# Patient Record
Sex: FEMALE | Race: WHITE | NOT HISPANIC OR LATINO | Employment: OTHER | URBAN - METROPOLITAN AREA
[De-identification: names, ages, dates, MRNs, and addresses within clinical notes are randomized per-mention and may not be internally consistent; named-entity substitution may affect disease eponyms.]

---

## 2019-02-22 ENCOUNTER — CONVERSION ENCOUNTER (OUTPATIENT)
Dept: PLASTIC SURGERY | Facility: CLINIC | Age: 64
End: 2019-02-22

## 2019-02-22 ENCOUNTER — OFFICE VISIT CONVERTED (OUTPATIENT)
Dept: PLASTIC SURGERY | Facility: CLINIC | Age: 64
End: 2019-02-22
Attending: PLASTIC SURGERY

## 2019-03-29 ENCOUNTER — OFFICE VISIT CONVERTED (OUTPATIENT)
Dept: ORTHOPEDIC SURGERY | Facility: CLINIC | Age: 64
End: 2019-03-29
Attending: PHYSICIAN ASSISTANT

## 2019-04-29 ENCOUNTER — OFFICE VISIT CONVERTED (OUTPATIENT)
Dept: ORTHOPEDIC SURGERY | Facility: CLINIC | Age: 64
End: 2019-04-29
Attending: PHYSICIAN ASSISTANT

## 2019-07-01 ENCOUNTER — OFFICE VISIT CONVERTED (OUTPATIENT)
Dept: ORTHOPEDIC SURGERY | Facility: CLINIC | Age: 64
End: 2019-07-01
Attending: PHYSICIAN ASSISTANT

## 2019-08-12 ENCOUNTER — OFFICE VISIT CONVERTED (OUTPATIENT)
Dept: ORTHOPEDIC SURGERY | Facility: CLINIC | Age: 64
End: 2019-08-12
Attending: PHYSICIAN ASSISTANT

## 2019-08-19 ENCOUNTER — HOSPITAL ENCOUNTER (OUTPATIENT)
Dept: MRI IMAGING | Facility: HOSPITAL | Age: 64
Discharge: HOME | End: 2019-08-19
Attending: ANESTHESIOLOGY

## 2019-10-09 ENCOUNTER — OFFICE VISIT CONVERTED (OUTPATIENT)
Dept: ORTHOPEDIC SURGERY | Facility: CLINIC | Age: 64
End: 2019-10-09

## 2021-05-15 VITALS
HEIGHT: 63 IN | OXYGEN SATURATION: 100 % | WEIGHT: 130 LBS | SYSTOLIC BLOOD PRESSURE: 117 MMHG | DIASTOLIC BLOOD PRESSURE: 76 MMHG | HEART RATE: 66 BPM | BODY MASS INDEX: 23.04 KG/M2

## 2021-05-15 VITALS — HEIGHT: 63 IN | WEIGHT: 127 LBS | OXYGEN SATURATION: 98 % | BODY MASS INDEX: 22.5 KG/M2 | HEART RATE: 76 BPM

## 2021-05-15 VITALS — OXYGEN SATURATION: 98 % | HEART RATE: 87 BPM | BODY MASS INDEX: 23.04 KG/M2 | HEIGHT: 63 IN | WEIGHT: 130 LBS

## 2021-05-15 VITALS — OXYGEN SATURATION: 98 % | BODY MASS INDEX: 23.04 KG/M2 | HEART RATE: 77 BPM | HEIGHT: 63 IN | WEIGHT: 130 LBS

## 2021-05-15 VITALS — HEART RATE: 73 BPM | WEIGHT: 124.5 LBS | OXYGEN SATURATION: 97 % | HEIGHT: 63 IN | BODY MASS INDEX: 22.06 KG/M2

## 2021-05-15 VITALS — OXYGEN SATURATION: 98 % | HEART RATE: 75 BPM | WEIGHT: 144.25 LBS | BODY MASS INDEX: 25.56 KG/M2 | HEIGHT: 63 IN

## 2022-03-09 NOTE — PROGRESS NOTES
Chief Complaint        Abdominal pain, vomiting,and diarrhea     History of Present Illness      Kimberly Landers is a 66 y.o. female who presents to [facility] as a new patient with a history of abdominal pain, altered bowel habits, diarrhea, nocturnal diarrhea, history of C. difficile, nausea and vomiting.  Patient reports for the past 3 months she has had resolution of her symptoms but was afraid to cancel her appointment due to symptoms prior.  Patient reports in January 2021 she was diagnosed with C. difficile infection which was treated with antibiotics.  She reports prior to her C. difficile infection that she was waking up throughout the night with diarrhea.  Patient reports after treatment for C. difficile this did not improve.  She reports waking up every morning at around 3 AM and having diarrhea every morning for about 2 hours for over a year.  Patient reports over the past 3 months her bowel movements have been good occurring 1-2 times daily with normal consistency and color.  Patient has been really watching her diet for foods that cause irritation.  She was having nausea and vomiting intermittently that has since resolved.  She denies any family history of colorectal cancer.  Patient denies fever, nausea, vomiting, weight loss, night sweats, melena, hematochezia, hematemesis.  Patient reports she had her gallbladder checked back in 2016 and had low ejection fraction at that time.    Labs performed on 12/07/2021    No recent abdominal imaging.     EGD: Review of the patient's most recent EGD performed by Dr. Santa on 07/07/2016 dyspepsia, heartburn, nausea postoperative diagnosis was gastritis.  It was noted that she had 29% ejection fraction prior to EGD.    Colonoscopy performed in January 2017 by Dr. Santa.    Results       Result Review :   The following data was reviewed by: Kylah Ang NP on 03/11/2022                        Past Medical History       Past  Medical History:   Diagnosis Date   • Chronic kidney disease    • Hypertension        Past Surgical History:   Procedure Laterality Date   • COLONOSCOPY     • DILATATION AND CURETTAGE      x3         Current Outpatient Medications:   •  amLODIPine (NORVASC) 10 MG tablet, amlodipine 10 mg tablet, Disp: , Rfl:   •  calcium carbonate (OS-PETE) 1250 (500 Ca) MG tablet, Oyster Shell Calcium 500  500 mg calcium (1,250 mg) tablet, Disp: , Rfl:   •  cefdinir (OMNICEF) 300 MG capsule, Take 300 mg by mouth 2 (Two) Times a Day., Disp: , Rfl:   •  cetirizine (zyrTEC) 10 MG tablet, Take 20 mg by mouth Daily., Disp: , Rfl:   •  Cholecalciferol 10 MCG (400 UNIT) capsule, Vitamin D3 10 mcg (400 unit) capsule  TAKE 1 CAPSULE BY MOUTH ONCE DAILY, Disp: , Rfl:   •  citalopram (CeleXA) 10 MG tablet, citalopram 10 mg tablet, Disp: , Rfl:   •  cyanocobalamin 1000 MCG/ML injection, cyanocobalamin (vit B-12) 1,000 mcg/mL injection solution, Disp: , Rfl:   •  cyclobenzaprine (FLEXERIL) 10 MG tablet, cyclobenzaprine 10 mg tablet, Disp: , Rfl:   •  cycloSPORINE, PF, (Cequa) 0.09 % solution, Cequa 0.09 % eye drops in a dropperette, Disp: , Rfl:   •  dicyclomine (BENTYL) 20 MG tablet, dicyclomine 20 mg tablet, Disp: , Rfl:   •  fluticasone (FLONASE) 50 MCG/ACT nasal spray, fluticasone propionate 50 mcg/actuation nasal spray,suspension  USE 2 SPRAY(S) IN EACH NOSTRIL ONCE DAILY, Disp: , Rfl:   •  hydroCHLOROthiazide (HYDRODIURIL) 12.5 MG tablet, hydrochlorothiazide 12.5 mg tablet, Disp: , Rfl:   •  hydrocortisone 2.5 % cream, APPLY TOPICALLY 2 TIMES DAILY, APPLY BILATERAL TO FOREARMS AS NEEDED FOR RASH/ITCHING, Disp: , Rfl:   •  hydrOXYzine (ATARAX) 25 MG tablet, TAKE 1/2 TO 1 (ONE-HALF TO ONE) TABLET BY MOUTH TWICE DAILY AS NEEDED FOR ANXIETY, Disp: , Rfl:   •  lisinopril (PRINIVIL,ZESTRIL) 40 MG tablet, lisinopril 40 mg tablet, Disp: , Rfl:   •  omeprazole (priLOSEC) 40 MG capsule, Take 40 mg by mouth Daily., Disp: , Rfl:   •  ondansetron ODT  "(ZOFRAN-ODT) 4 MG disintegrating tablet, ondansetron 4 mg disintegrating tablet  DISSOLVE 1 TABLET IN MOUTH THREE TIMES DAILY AS NEEDED, Disp: , Rfl:   •  propranolol (INDERAL) 40 MG tablet, propranolol 40 mg tablet  TAKE 1 TABLET BY MOUTH TWICE DAILY, Disp: , Rfl:   •  polyethylene glycol (GoLYTELY) 236 g solution, Instructions provided by the office.  Colon prep., Disp: 4000 mL, Rfl: 0     Allergies   Allergen Reactions   • Codeine Nausea And Vomiting   • Propoxyphene Nausea And Vomiting     Hot flashes and sweaty         Family History   Problem Relation Age of Onset   • Colon cancer Neg Hx         Social History     Social History Narrative   • Not on file       Objective       Objective     Vital Signs:   /73 (BP Location: Left arm, Patient Position: Sitting, Cuff Size: Adult)   Pulse 66   Ht 160 cm (63\")   Wt 63.2 kg (139 lb 6.4 oz)   SpO2 100%   BMI 24.69 kg/m²     Body mass index is 24.69 kg/m².    Physical Exam  Constitutional:       General: She is not in acute distress.     Appearance: Normal appearance. She is well-developed and normal weight.   Eyes:      Conjunctiva/sclera: Conjunctivae normal.      Pupils: Pupils are equal, round, and reactive to light.      Visual Fields: Right eye visual fields normal and left eye visual fields normal.   Cardiovascular:      Rate and Rhythm: Normal rate and regular rhythm.      Heart sounds: Normal heart sounds.   Pulmonary:      Effort: Pulmonary effort is normal. No retractions.      Breath sounds: Normal breath sounds and air entry.      Comments: Inspection of chest: normal appearance  Abdominal:      General: Bowel sounds are normal.      Palpations: Abdomen is soft.      Tenderness: There is no abdominal tenderness.      Comments: No appreciable hepatosplenomegaly   Musculoskeletal:      Cervical back: Neck supple.      Right lower leg: No edema.      Left lower leg: No edema.   Lymphadenopathy:      Cervical: No cervical adenopathy.   Skin:     " Findings: No lesion.      Comments: Turgor normal   Neurological:      Mental Status: She is alert and oriented to person, place, and time.   Psychiatric:         Mood and Affect: Mood and affect normal.              Assessment & Plan          Assessment and Plan    Diagnoses and all orders for this visit:    1. Generalized abdominal pain (Primary)  -     Cancel: US Abdomen Limited; Future  -     COVID-19,APTIMA PANTHER(JIM),[facility], NP/OP SWAB IN UTM/VTM/SALINE TRANSPORT MEDIA,24 HR TAT - Swab, Nasal Cavity; Future  -     US Abdomen Limited; Future    2. Altered bowel habits  -     Cancel: US Abdomen Limited; Future  -     COVID-19,APTIMA PANTHER(JIM),[facility], NP/OP SWAB IN UTM/VTM/SALINE TRANSPORT MEDIA,24 HR TAT - Swab, Nasal Cavity; Future  -     US Abdomen Limited; Future    3. Diarrhea, unspecified type  -     Cancel: US Abdomen Limited; Future  -     COVID-19,APTIMA PANTHER(JIM),[facility], NP/OP SWAB IN UTM/VTM/SALINE TRANSPORT MEDIA,24 HR TAT - Swab, Nasal Cavity; Future  -     US Abdomen Limited; Future    4. Nocturnal diarrhea  -     Cancel: US Abdomen Limited; Future  -     COVID-19,APTIMA PANTHER(JIM),[facility], NP/OP SWAB IN UTM/VTM/SALINE TRANSPORT MEDIA,24 HR TAT - Swab, Nasal Cavity; Future  -     US Abdomen Limited; Future    5. History of Clostridioides difficile infection  -     Cancel: US Abdomen Limited; Future  -     COVID-19,APTIMA PANTHER(JIM),[facility], NP/OP SWAB IN UTM/VTM/SALINE TRANSPORT MEDIA,24 HR TAT - Swab, Nasal Cavity; Future  -     US Abdomen Limited; Future    6. Nausea  -     Cancel: US Abdomen Limited; Future  -     COVID-19,APTIMA PANTHER(JIM),[facility], NP/OP SWAB IN UTM/VTM/SALINE TRANSPORT MEDIA,24 HR TAT - Swab, Nasal Cavity; Future  -     US Abdomen Limited; Future    7. Nausea and vomiting, intractability of vomiting not specified, unspecified vomiting type  -     Cancel: US Abdomen Limited; Future  -     COVID-19,APTIMA PANTHER(JIM),[facility]  [facility], NP/OP SWAB IN UTM/VTM/SALINE TRANSPORT MEDIA,24 HR TAT - Swab, Nasal Cavity; Future  -     US Abdomen Limited; Future      66-year-old female presenting the office today as a new patient with a history of abdominal pain, altered bowel habits, diarrhea, nocturnal diarrhea, history of C. difficile, nausea and vomiting.  I have recommended that the patient undergo further evaluation with an EGD and colonoscopy.  I have discussed this procedure in detail with the patient.  I have discussed the risks, benefits and alternatives.  I have discussed the risk of anesthesia, bleeding and perforation.  Patient understands these risks, benefits and alternatives and wishes to proceed.  I will schedule her at her earliest convenience.  I have ordered an ultrasound of the right upper quadrant to evaluate her gallbladder as she had an ejection fraction 29% previously.  Patient will follow up in the office after endoscopy.  Patient agreeable to this plan will call with any questions or concerns.            Follow Up       Follow Up   Return for Follow up after endoscopy in office.  Patient was given instructions and counseling regarding her condition or for health maintenance advice. Please see specific information pulled into the AVS if appropriate.

## 2022-03-11 ENCOUNTER — OFFICE VISIT (OUTPATIENT)
Dept: GASTROENTEROLOGY | Facility: CLINIC | Age: 67
End: 2022-03-11

## 2022-03-11 ENCOUNTER — PREP FOR SURGERY (OUTPATIENT)
Dept: OTHER | Facility: HOSPITAL | Age: 67
End: 2022-03-11

## 2022-03-11 VITALS
HEIGHT: 63 IN | SYSTOLIC BLOOD PRESSURE: 141 MMHG | BODY MASS INDEX: 24.7 KG/M2 | WEIGHT: 139.4 LBS | DIASTOLIC BLOOD PRESSURE: 73 MMHG | OXYGEN SATURATION: 100 % | HEART RATE: 66 BPM

## 2022-03-11 DIAGNOSIS — R11.0 NAUSEA: ICD-10-CM

## 2022-03-11 DIAGNOSIS — R10.84 GENERALIZED ABDOMINAL PAIN: Primary | ICD-10-CM

## 2022-03-11 DIAGNOSIS — Z86.19 HISTORY OF CLOSTRIDIOIDES DIFFICILE INFECTION: ICD-10-CM

## 2022-03-11 DIAGNOSIS — R19.4 ALTERED BOWEL HABITS: ICD-10-CM

## 2022-03-11 DIAGNOSIS — R11.2 NAUSEA AND VOMITING, INTRACTABILITY OF VOMITING NOT SPECIFIED, UNSPECIFIED VOMITING TYPE: ICD-10-CM

## 2022-03-11 DIAGNOSIS — R19.7 NOCTURNAL DIARRHEA: ICD-10-CM

## 2022-03-11 DIAGNOSIS — R19.7 DIARRHEA, UNSPECIFIED TYPE: ICD-10-CM

## 2022-03-11 PROCEDURE — 99204 OFFICE O/P NEW MOD 45 MIN: CPT | Performed by: NURSE PRACTITIONER

## 2022-03-11 RX ORDER — CEFDINIR 300 MG/1
300 CAPSULE ORAL 2 TIMES DAILY
COMMUNITY
Start: 2022-03-09 | End: 2022-05-17

## 2022-03-11 RX ORDER — ONDANSETRON 4 MG/1
4 TABLET, ORALLY DISINTEGRATING ORAL EVERY 8 HOURS PRN
COMMUNITY
Start: 2022-01-24

## 2022-03-11 RX ORDER — OMEPRAZOLE 40 MG/1
40 CAPSULE, DELAYED RELEASE ORAL DAILY
COMMUNITY
Start: 2022-01-24

## 2022-03-11 RX ORDER — FLUTICASONE PROPIONATE 50 MCG
1 SPRAY, SUSPENSION (ML) NASAL AS NEEDED
COMMUNITY
Start: 2022-01-28

## 2022-03-11 RX ORDER — CYCLOBENZAPRINE HCL 10 MG
TABLET ORAL
COMMUNITY
End: 2022-05-17

## 2022-03-11 RX ORDER — AMLODIPINE BESYLATE 10 MG/1
10 TABLET ORAL AS NEEDED
COMMUNITY
Start: 2021-10-29

## 2022-03-11 RX ORDER — CETIRIZINE HYDROCHLORIDE 10 MG/1
20 TABLET ORAL DAILY
COMMUNITY
Start: 2022-02-26 | End: 2022-05-17

## 2022-03-11 RX ORDER — CITALOPRAM 10 MG/1
TABLET ORAL
COMMUNITY
End: 2022-05-17

## 2022-03-11 RX ORDER — PROPRANOLOL HYDROCHLORIDE 40 MG/1
40 TABLET ORAL DAILY
COMMUNITY
Start: 2022-01-26

## 2022-03-11 RX ORDER — DICYCLOMINE HCL 20 MG
TABLET ORAL
COMMUNITY
End: 2022-05-17

## 2022-03-11 RX ORDER — CYCLOSPORINE 0 G/ML
1 SOLUTION/ DROPS OPHTHALMIC; TOPICAL 2 TIMES DAILY
COMMUNITY

## 2022-03-11 RX ORDER — LISINOPRIL 40 MG/1
40 TABLET ORAL DAILY
COMMUNITY
Start: 2022-01-24

## 2022-03-11 RX ORDER — HYDROXYZINE HYDROCHLORIDE 25 MG/1
25 TABLET, FILM COATED ORAL
COMMUNITY
Start: 2022-01-28 | End: 2022-05-17

## 2022-03-11 RX ORDER — IBUPROFEN 200 MG
CAPSULE ORAL
COMMUNITY
End: 2022-05-17

## 2022-03-11 RX ORDER — HYDROCHLOROTHIAZIDE 12.5 MG/1
12.5 TABLET ORAL DAILY
COMMUNITY
Start: 2022-01-24

## 2022-03-11 RX ORDER — CYANOCOBALAMIN 1000 UG/ML
1000 INJECTION, SOLUTION INTRAMUSCULAR; SUBCUTANEOUS
COMMUNITY

## 2022-03-16 ENCOUNTER — PATIENT ROUNDING (BHMG ONLY) (OUTPATIENT)
Dept: GASTROENTEROLOGY | Facility: CLINIC | Age: 67
End: 2022-03-16

## 2022-03-16 NOTE — PROGRESS NOTES
March 16, 2022    Hello, may I speak with Kimberlyashwin Landers?    My name is Tessa     I am  with [facility]  [address] [place] [address] [phone].    Before we get started may I verify your date of birth? 1955    I am calling to officially welcome you to our practice and ask about your recent visit. Is this a good time to talk? Yes     Tell me about your visit with us. What things went well?  Everything went well, everyone was very nice, helpful & explained everything very well      We're always looking for ways to make our patients' experiences even better. Do you have recommendations on ways we may improve?  No     Overall were you satisfied with your first visit to our practice? Yes      I appreciate you taking the time to speak with me today. Is there anything else I can do for you? No     Thank you, and have a great day.

## 2022-04-12 ENCOUNTER — TELEPHONE (OUTPATIENT)
Dept: GASTROENTEROLOGY | Facility: CLINIC | Age: 67
End: 2022-04-12

## 2022-04-12 NOTE — TELEPHONE ENCOUNTER
Patient did not keep US appt at [facility]. I spoke with pt and offered to r/s but pt would like to defer at this time. Patient aware to contact office when she would like to r/s. Patient agreed to this plan.

## 2022-04-29 ENCOUNTER — APPOINTMENT (OUTPATIENT)
Dept: LAB | Facility: HOSPITAL | Age: 67
End: 2022-04-29

## 2022-05-11 ENCOUNTER — TELEPHONE (OUTPATIENT)
Dept: GASTROENTEROLOGY | Facility: CLINIC | Age: 67
End: 2022-05-11

## 2022-05-11 RX ORDER — SODIUM PICOSULFATE, MAGNESIUM OXIDE, AND ANHYDROUS CITRIC ACID 10; 3.5; 12 MG/160ML; G/160ML; G/160ML
160 LIQUID ORAL TAKE AS DIRECTED
Qty: 320 ML | Refills: 0 | Status: SHIPPED | OUTPATIENT
Start: 2022-05-11 | End: 2022-05-16 | Stop reason: ALTCHOICE

## 2022-05-16 NOTE — TELEPHONE ENCOUNTER
"I spoke with [facility].  clenpic rx was still \"on hold'. Pharmacy ran with ins, insurance will not cover. Stated preferred was gallon jug or sutab.  But they do not have sutab in stock..    Will send in gallon jub since her scope is sched for 05/18/22. Orlin  "

## 2022-05-17 NOTE — PRE-PROCEDURE INSTRUCTIONS
"PT INSTRUCTED ON CLEAR LIQ DIET AND PO SPLIT PREP LAST BM SHOULD BE CLEAR  PT CAN TAKE OMEPRAZOLE, AND INDERAL    WITH A SIP OF WATER IN THE AM OF THE PROCEDURE ARRIVAL TIME IS 0900 AM  ON WED. 5/18/22 PT HAS \"NOT\" BEEN  VACCINATED COVID TEST DONE 5/13/22  "

## 2022-05-18 ENCOUNTER — ANESTHESIA (OUTPATIENT)
Dept: GASTROENTEROLOGY | Facility: HOSPITAL | Age: 67
End: 2022-05-18

## 2022-05-18 ENCOUNTER — ANESTHESIA EVENT (OUTPATIENT)
Dept: GASTROENTEROLOGY | Facility: HOSPITAL | Age: 67
End: 2022-05-18

## 2022-05-18 ENCOUNTER — HOSPITAL ENCOUNTER (OUTPATIENT)
Facility: HOSPITAL | Age: 67
Setting detail: HOSPITAL OUTPATIENT SURGERY
Discharge: HOME | End: 2022-05-18
Attending: INTERNAL MEDICINE | Admitting: INTERNAL MEDICINE

## 2022-05-18 VITALS
SYSTOLIC BLOOD PRESSURE: 123 MMHG | HEIGHT: 63 IN | BODY MASS INDEX: 24.38 KG/M2 | RESPIRATION RATE: 20 BRPM | OXYGEN SATURATION: 1 % | DIASTOLIC BLOOD PRESSURE: 73 MMHG | TEMPERATURE: 97.2 F | HEART RATE: 73 BPM | WEIGHT: 137.57 LBS

## 2022-05-18 DIAGNOSIS — R11.2 NAUSEA AND VOMITING: ICD-10-CM

## 2022-05-18 DIAGNOSIS — R10.84 GENERALIZED ABDOMINAL PAIN: ICD-10-CM

## 2022-05-18 DIAGNOSIS — R19.7 DIARRHEA, UNSPECIFIED TYPE: ICD-10-CM

## 2022-05-18 DIAGNOSIS — R11.0 NAUSEA: ICD-10-CM

## 2022-05-18 DIAGNOSIS — Z86.19 HISTORY OF CLOSTRIDIOIDES DIFFICILE INFECTION: ICD-10-CM

## 2022-05-18 DIAGNOSIS — R19.7 NOCTURNAL DIARRHEA: ICD-10-CM

## 2022-05-18 DIAGNOSIS — R19.4 ALTERED BOWEL HABITS: ICD-10-CM

## 2022-05-18 DIAGNOSIS — R11.2 NAUSEA AND VOMITING, INTRACTABILITY OF VOMITING NOT SPECIFIED, UNSPECIFIED VOMITING TYPE: ICD-10-CM

## 2022-05-18 PROCEDURE — 45380 COLONOSCOPY AND BIOPSY: CPT | Performed by: INTERNAL MEDICINE

## 2022-05-18 PROCEDURE — 88305 TISSUE EXAM BY PATHOLOGIST: CPT | Performed by: INTERNAL MEDICINE

## 2022-05-18 PROCEDURE — 43239 EGD BIOPSY SINGLE/MULTIPLE: CPT | Performed by: INTERNAL MEDICINE

## 2022-05-18 PROCEDURE — 25010000002 PROPOFOL 10 MG/ML EMULSION: Performed by: NURSE ANESTHETIST, CERTIFIED REGISTERED

## 2022-05-18 RX ORDER — PROPOFOL 10 MG/ML
VIAL (ML) INTRAVENOUS AS NEEDED
Status: DISCONTINUED | OUTPATIENT
Start: 2022-05-18 | End: 2022-05-18 | Stop reason: SURG

## 2022-05-18 RX ORDER — LIDOCAINE HYDROCHLORIDE 20 MG/ML
INJECTION, SOLUTION EPIDURAL; INFILTRATION; INTRACAUDAL; PERINEURAL AS NEEDED
Status: DISCONTINUED | OUTPATIENT
Start: 2022-05-18 | End: 2022-05-18 | Stop reason: SURG

## 2022-05-18 RX ORDER — SODIUM CHLORIDE, SODIUM LACTATE, POTASSIUM CHLORIDE, CALCIUM CHLORIDE 600; 310; 30; 20 MG/100ML; MG/100ML; MG/100ML; MG/100ML
30 INJECTION, SOLUTION INTRAVENOUS CONTINUOUS
Status: DISCONTINUED | OUTPATIENT
Start: 2022-05-18 | End: 2022-05-18 | Stop reason: HOSPADM

## 2022-05-18 RX ADMIN — PROPOFOL 100 MG: 10 INJECTION, EMULSION INTRAVENOUS at 11:31

## 2022-05-18 RX ADMIN — LIDOCAINE HYDROCHLORIDE 100 MG: 20 INJECTION, SOLUTION EPIDURAL; INFILTRATION; INTRACAUDAL; PERINEURAL at 11:31

## 2022-05-18 RX ADMIN — PROPOFOL 150 MCG/KG/MIN: 10 INJECTION, EMULSION INTRAVENOUS at 11:31

## 2022-05-18 RX ADMIN — SODIUM CHLORIDE, POTASSIUM CHLORIDE, SODIUM LACTATE AND CALCIUM CHLORIDE 30 ML/HR: 600; 310; 30; 20 INJECTION, SOLUTION INTRAVENOUS at 10:00

## 2022-05-18 NOTE — ANESTHESIA PREPROCEDURE EVALUATION
Anesthesia Evaluation     Patient summary reviewed and Nursing notes reviewed   NPO Solid Status: > 6 hours  NPO Liquid Status: > 6 hours           Airway   Mallampati: II  TM distance: >3 FB  Dental      Pulmonary - negative pulmonary ROS and normal exam   Cardiovascular - normal exam  Exercise tolerance: good (4-7 METS)    (+) hypertension,       Neuro/Psych  GI/Hepatic/Renal/Endo    (+)  GERD,  renal disease,     Musculoskeletal     Abdominal    Substance History      OB/GYN          Other                        Anesthesia Plan    ASA 2     general and MAC     intravenous induction     Anesthetic plan, all risks, benefits, and alternatives have been provided, discussed and informed consent has been obtained with: patient.        CODE STATUS:

## 2022-05-18 NOTE — H&P
Pre Procedure History & Physical    Chief Complaint:   Generalized abdominal pain  Diarrhea  nausea    Subjective     HPI:   As above    Past Medical History:   Past Medical History:   Diagnosis Date   • Chronic kidney disease    • GERD (gastroesophageal reflux disease)    • Hypertension        Past Surgical History:  Past Surgical History:   Procedure Laterality Date   • COLONOSCOPY     • DILATATION AND CURETTAGE      x3       Family History:  Family History   Problem Relation Age of Onset   • Cancer Mother    • Colon cancer Neg Hx    • Malig Hyperthermia Neg Hx        Social History:   reports that she is a non-smoker but has been exposed to tobacco smoke. She has never used smokeless tobacco. She reports that she does not drink alcohol and does not use drugs.    Medications:   Medications Prior to Admission   Medication Sig Dispense Refill Last Dose   • Cholecalciferol 10 MCG (400 UNIT) capsule Take 400 Units by mouth Daily.   Past Week at Unknown time   • cyanocobalamin 1000 MCG/ML injection Inject 1,000 mcg under the skin into the appropriate area as directed Every 28 (Twenty-Eight) Days.   Past Month at Unknown time   • cycloSPORINE, PF, (Cequa) 0.09 % solution Administer 1 drop to both eyes 2 (Two) Times a Day.   5/17/2022 at Unknown time   • fluticasone (FLONASE) 50 MCG/ACT nasal spray 1 spray into the nostril(s) as directed by provider As Needed.   Past Week at Unknown time   • hydroCHLOROthiazide (HYDRODIURIL) 12.5 MG tablet Take 12.5 mg by mouth Daily.   5/17/2022 at Unknown time   • lisinopril (PRINIVIL,ZESTRIL) 40 MG tablet Take 40 mg by mouth Daily.   5/17/2022 at Unknown time   • omeprazole (priLOSEC) 40 MG capsule Take 40 mg by mouth Daily.   5/18/2022 at Unknown time   • propranolol (INDERAL) 40 MG tablet Take 40 mg by mouth Daily.   5/18/2022 at Unknown time   • amLODIPine (NORVASC) 10 MG tablet Take 10 mg by mouth As Needed. LAST DOSE  6 MONTHS AGO   More than a month at Unknown time   •  "ondansetron ODT (ZOFRAN-ODT) 4 MG disintegrating tablet Place 4 mg on the tongue Every 8 (Eight) Hours As Needed.   More than a month at Unknown time       Allergies:  Propoxyphene and Codeine        Objective     Pulse 66, temperature 98 °F (36.7 °C), temperature source Temporal, resp. rate 20, height 160 cm (62.99\"), weight 62.4 kg (137 lb 9.1 oz), SpO2 95 %.    Physical Exam   Constitutional: Pt is oriented to person, place, and time and well-developed, well-nourished, and in no distress.   Mouth/Throat: Oropharynx is clear and moist.   Neck: Normal range of motion.   Cardiovascular: Normal rate, regular rhythm and normal heart sounds.    Pulmonary/Chest: Effort normal and breath sounds normal.   Abdominal: Soft. Nontender  Skin: Skin is warm and dry.   Psychiatric: Mood, memory, affect and judgment normal.     Assessment & Plan     Diagnosis:  Generalized abdominal pain  gerd  nausea    Anticipated Surgical Procedure:  egd  colonoscopy    The risks, benefits, and alternatives of this procedure have been discussed with the patient or the responsible party- the patient understands and agrees to proceed.            "

## 2022-05-18 NOTE — ANESTHESIA POSTPROCEDURE EVALUATION
Patient: Kimberly Landers    Procedure Summary     Date: 05/18/22 Room / Location: [facility] ENDOSCOPY 2 / [facility]    Anesthesia Start: 1119 Anesthesia Stop: 1156    Procedures:       ESOPHAGOGASTRODUODENOSCOPY WITH BX (N/A )      COLONOSCOPY WITH BX (N/A ) Diagnosis:       Generalized abdominal pain      Altered bowel habits      Diarrhea, unspecified type      Nocturnal diarrhea      History of Clostridioides difficile infection      Nausea and vomiting, intractability of vomiting not specified, unspecified vomiting type      Nausea      (Generalized abdominal pain [R10.84])      (Altered bowel habits [R19.4])      (Diarrhea, unspecified type [R19.7])      (Nocturnal diarrhea [R19.7])      (History of Clostridioides difficile infection [Z86.19])      (Nausea and vomiting, intractability of vomiting not specified, unspecified vomiting type [R11.2])      (Nausea [R11.0])    Surgeons: Gordo Lange MD Provider: Reed Tamayo MD    Anesthesia Type: general, MAC ASA Status: 2          Anesthesia Type: general, MAC    Vitals  Vitals Value Taken Time   /73 05/18/22 1210   Temp 36.2 °C (97.2 °F) 05/18/22 1215   Pulse 73 05/18/22 1215   Resp 20 05/18/22 1215   SpO2 1 % 05/18/22 1216           Post Anesthesia Care and Evaluation    Patient location during evaluation: bedside  Patient participation: complete - patient participated  Level of consciousness: awake  Pain score: 0  Pain management: adequate  Airway patency: patent  Anesthetic complications: No anesthetic complications  PONV Status: none  Cardiovascular status: acceptable and stable  Respiratory status: acceptable and room air  Hydration status: acceptable    Comments: An Anesthesiologist personally participated in the most demanding procedures (including induction and emergence if applicable) in the anesthesia plan, monitored the course of anesthesia administration at frequent intervals and remained physically present and available for  immediate diagnosis and treatment of emergencies.

## 2022-05-19 LAB
CYTO UR: NORMAL
LAB AP CASE REPORT: NORMAL
LAB AP CLINICAL INFORMATION: NORMAL
PATH REPORT.FINAL DX SPEC: NORMAL
PATH REPORT.GROSS SPEC: NORMAL

## 2023-01-25 ENCOUNTER — OFFICE VISIT (OUTPATIENT)
Dept: ORTHOPEDIC SURGERY | Facility: CLINIC | Age: 68
End: 2023-01-25
Payer: MEDICARE

## 2023-01-25 VITALS — HEIGHT: 63 IN | HEART RATE: 69 BPM | WEIGHT: 134 LBS | OXYGEN SATURATION: 97 % | BODY MASS INDEX: 23.74 KG/M2

## 2023-01-25 DIAGNOSIS — M79.671 RIGHT FOOT PAIN: Primary | ICD-10-CM

## 2023-01-25 DIAGNOSIS — M19.079 ARTHRITIS OF FOOT: ICD-10-CM

## 2023-01-25 PROCEDURE — 99203 OFFICE O/P NEW LOW 30 MIN: CPT | Performed by: STUDENT IN AN ORGANIZED HEALTH CARE EDUCATION/TRAINING PROGRAM

## 2023-01-25 RX ORDER — SACUBITRIL AND VALSARTAN 97; 103 MG/1; MG/1
TABLET, FILM COATED ORAL
COMMUNITY

## 2023-01-25 RX ORDER — HYDROXYZINE HYDROCHLORIDE 25 MG/1
TABLET, FILM COATED ORAL
COMMUNITY

## 2023-01-25 RX ORDER — CALCIUM CARBONATE 300MG(750)
1 TABLET,CHEWABLE ORAL DAILY
COMMUNITY
Start: 2023-01-11

## 2023-01-25 NOTE — PROGRESS NOTES
"Chief Complaint  Pain and Initial Evaluation of the Right Foot    Subjective          Kimberly Landers presents to [facility] for   History of Present Illness    The patient presents here today for evaluation of the right foot. She reports she broke her foot 3 years ago and she was treated conservatively with a cast, boot and tens unit.  She reports right foot pain today. She denies a new injury or trauma.   Allergies   Allergen Reactions   • Alprazolam Shortness Of Breath   • Propoxyphene Nausea And Vomiting     Hot flashes and sweaty     • Codeine Nausea And Vomiting        Social History     Socioeconomic History   • Marital status: Single   Tobacco Use   • Smoking status: Never     Passive exposure: Yes   • Smokeless tobacco: Never   Vaping Use   • Vaping Use: Never used   Substance and Sexual Activity   • Alcohol use: Never   • Drug use: Never   • Sexual activity: Defer        I reviewed the patient's chief complaint, history of present illness, review of systems, past medical history, surgical history, family history, social history, medications, and allergy list.     REVIEW OF SYSTEMS    Constitutional: Denies fevers, chills, weight loss  Cardiovascular: Denies chest pain, shortness of breath  Skin: Denies rashes, acute skin changes  Neurologic: Denies headache, loss of consciousness  MSK: Right foot pain      Objective   Vital Signs:   Pulse 69   Ht 160 cm (63\")   Wt 60.8 kg (134 lb)   SpO2 97%   BMI 23.74 kg/m²     Body mass index is 23.74 kg/m².    Physical Exam    General: Alert. No acute distress.   Right foot- callous over the lateral border. Tender to the base of the 5th metatarsal. Neurovascularly intact. No skin discoloration, atrophy or swelling. Neurovascularly intact. Positive EHL, FHL, GS and TA. Sensation intact to all 5 nerves of the foot. Positive pulses. Arch maintained. Calf soft. Ankle stable to stress.     Procedures    Imaging Results (Most Recent)     " Procedure Component Value Units Date/Time    XR Foot 3+ View Right [[id number]] Resulted: 01/25/23 1302     Updated: 01/25/23 1303    Narrative:      Indications: Right foot pain, history of fifth metatarsal fracture    Views: AP, oblique, lateral right foot    Findings: No fracture lines are visualized.  All joints reduced.  He had   calluses present at the lateral base of the fifth metatarsal.    Comparative Data: No comparative data available                     Assessment and Plan        XR Foot 3+ View Right    Result Date: 1/25/2023  Narrative: Indications: Right foot pain, history of fifth metatarsal fracture Views: AP, oblique, lateral right foot Findings: No fracture lines are visualized.  All joints reduced.  He had calluses present at the lateral base of the fifth metatarsal. Comparative Data: No comparative data available        Diagnoses and all orders for this visit:    1. Right foot pain (Primary)  -     XR Foot 3+ View Right    2. Arthritis of foot        Discussed the treatment plan with the patient.  I reviewed the x-rays that were obtained today with the patient. I expressed to her that her old fracture is healed but she does have some callous formation. I advised her she can cushion this to keep it from rubbing. She can get an insert for her shoe for her foot arthritis.       Call or return if worsening symptoms.    Scribed for Steve Redmond MD by Radha Castillo  01/25/2023   09:15 EST         Follow Up       PRN    Patient was given instructions and counseling regarding her condition or for health maintenance advice. Please see specific information pulled into the AVS if appropriate.       I have personally performed the services described in this document as scribed by the above individual and it is both accurate and complete.     Steve Redmond MD  01/26/23  11:27 EST

## 2023-04-12 ENCOUNTER — APPOINTMENT (OUTPATIENT)
Dept: GENERAL RADIOLOGY | Facility: HOSPITAL | Age: 68
DRG: 482 | End: 2023-04-12
Payer: MEDICARE

## 2023-04-12 ENCOUNTER — HOSPITAL ENCOUNTER (INPATIENT)
Facility: HOSPITAL | Age: 68
LOS: 3 days | Discharge: HOME HEALTH | DRG: 482 | End: 2023-04-15
Attending: STUDENT IN AN ORGANIZED HEALTH CARE EDUCATION/TRAINING PROGRAM | Admitting: INTERNAL MEDICINE
Payer: MEDICARE

## 2023-04-12 DIAGNOSIS — S72.002A CLOSED FRACTURE OF LEFT HIP, INITIAL ENCOUNTER: Primary | ICD-10-CM

## 2023-04-12 DIAGNOSIS — S72.009A FEMORAL NECK FRACTURE: ICD-10-CM

## 2023-04-12 DIAGNOSIS — Z78.9 DECREASED ACTIVITIES OF DAILY LIVING (ADL): ICD-10-CM

## 2023-04-12 DIAGNOSIS — R26.2 DIFFICULTY IN WALKING: ICD-10-CM

## 2023-04-12 LAB
ALBUMIN SERPL-MCNC: 3.5 G/DL (ref 3.5–5.2)
ALBUMIN/GLOB SERPL: 1.3 G/DL
ALP SERPL-CCNC: 61 U/L (ref 39–117)
ALT SERPL W P-5'-P-CCNC: 22 U/L (ref 1–33)
ANION GAP SERPL CALCULATED.3IONS-SCNC: 9.2 MMOL/L (ref 5–15)
AST SERPL-CCNC: 21 U/L (ref 1–32)
BASOPHILS # BLD AUTO: 0.03 10*3/MM3 (ref 0–0.2)
BASOPHILS NFR BLD AUTO: 0.4 % (ref 0–1.5)
BILIRUB SERPL-MCNC: 0.8 MG/DL (ref 0–1.2)
BUN SERPL-MCNC: 18 MG/DL (ref 8–23)
BUN/CREAT SERPL: 19.6 (ref 7–25)
CALCIUM SPEC-SCNC: 8.9 MG/DL (ref 8.6–10.5)
CHLORIDE SERPL-SCNC: 100 MMOL/L (ref 98–107)
CO2 SERPL-SCNC: 27.8 MMOL/L (ref 22–29)
CREAT SERPL-MCNC: 0.92 MG/DL (ref 0.57–1)
DEPRECATED RDW RBC AUTO: 41 FL (ref 37–54)
EGFRCR SERPLBLD CKD-EPI 2021: 68.4 ML/MIN/1.73
EOSINOPHIL # BLD AUTO: 0.24 10*3/MM3 (ref 0–0.4)
EOSINOPHIL NFR BLD AUTO: 3.3 % (ref 0.3–6.2)
ERYTHROCYTE [DISTWIDTH] IN BLOOD BY AUTOMATED COUNT: 12.5 % (ref 12.3–15.4)
GLOBULIN UR ELPH-MCNC: 2.7 GM/DL
GLUCOSE SERPL-MCNC: 105 MG/DL (ref 65–99)
HCT VFR BLD AUTO: 41.4 % (ref 34–46.6)
HGB BLD-MCNC: 13.6 G/DL (ref 12–15.9)
IMM GRANULOCYTES # BLD AUTO: 0.02 10*3/MM3 (ref 0–0.05)
IMM GRANULOCYTES NFR BLD AUTO: 0.3 % (ref 0–0.5)
LYMPHOCYTES # BLD AUTO: 1.33 10*3/MM3 (ref 0.7–3.1)
LYMPHOCYTES NFR BLD AUTO: 18.4 % (ref 19.6–45.3)
MAGNESIUM SERPL-MCNC: 1.9 MG/DL (ref 1.6–2.4)
MCH RBC QN AUTO: 29.6 PG (ref 26.6–33)
MCHC RBC AUTO-ENTMCNC: 32.9 G/DL (ref 31.5–35.7)
MCV RBC AUTO: 90.2 FL (ref 79–97)
MONOCYTES # BLD AUTO: 0.61 10*3/MM3 (ref 0.1–0.9)
MONOCYTES NFR BLD AUTO: 8.4 % (ref 5–12)
NEUTROPHILS NFR BLD AUTO: 4.99 10*3/MM3 (ref 1.7–7)
NEUTROPHILS NFR BLD AUTO: 69.2 % (ref 42.7–76)
NRBC BLD AUTO-RTO: 0 /100 WBC (ref 0–0.2)
PHOSPHATE SERPL-MCNC: 3.5 MG/DL (ref 2.5–4.5)
PLATELET # BLD AUTO: 147 10*3/MM3 (ref 140–450)
PMV BLD AUTO: 9.3 FL (ref 6–12)
POTASSIUM SERPL-SCNC: 4.1 MMOL/L (ref 3.5–5.2)
PROT SERPL-MCNC: 6.2 G/DL (ref 6–8.5)
RBC # BLD AUTO: 4.59 10*6/MM3 (ref 3.77–5.28)
SODIUM SERPL-SCNC: 137 MMOL/L (ref 136–145)
WBC NRBC COR # BLD: 7.22 10*3/MM3 (ref 3.4–10.8)

## 2023-04-12 PROCEDURE — 94799 UNLISTED PULMONARY SVC/PX: CPT

## 2023-04-12 PROCEDURE — 84100 ASSAY OF PHOSPHORUS: CPT | Performed by: STUDENT IN AN ORGANIZED HEALTH CARE EDUCATION/TRAINING PROGRAM

## 2023-04-12 PROCEDURE — 85025 COMPLETE CBC W/AUTO DIFF WBC: CPT | Performed by: STUDENT IN AN ORGANIZED HEALTH CARE EDUCATION/TRAINING PROGRAM

## 2023-04-12 PROCEDURE — 80053 COMPREHEN METABOLIC PANEL: CPT | Performed by: STUDENT IN AN ORGANIZED HEALTH CARE EDUCATION/TRAINING PROGRAM

## 2023-04-12 PROCEDURE — 83735 ASSAY OF MAGNESIUM: CPT | Performed by: STUDENT IN AN ORGANIZED HEALTH CARE EDUCATION/TRAINING PROGRAM

## 2023-04-12 PROCEDURE — 99223 1ST HOSP IP/OBS HIGH 75: CPT | Performed by: INTERNAL MEDICINE

## 2023-04-12 PROCEDURE — 25010000002 ONDANSETRON PER 1 MG: Performed by: INTERNAL MEDICINE

## 2023-04-12 PROCEDURE — 93010 ELECTROCARDIOGRAM REPORT: CPT | Performed by: INTERNAL MEDICINE

## 2023-04-12 PROCEDURE — 73502 X-RAY EXAM HIP UNI 2-3 VIEWS: CPT

## 2023-04-12 PROCEDURE — 93005 ELECTROCARDIOGRAM TRACING: CPT | Performed by: INTERNAL MEDICINE

## 2023-04-12 RX ORDER — ASPIRIN 81 MG/1
81 TABLET ORAL DAILY
COMMUNITY
End: 2023-04-15 | Stop reason: HOSPADM

## 2023-04-12 RX ORDER — DIAZEPAM 5 MG/1
1 TABLET ORAL EVERY 12 HOURS PRN
Status: ON HOLD | COMMUNITY
End: 2023-04-12

## 2023-04-12 RX ORDER — MORPHINE SULFATE 2 MG/ML
2 INJECTION, SOLUTION INTRAMUSCULAR; INTRAVENOUS EVERY 4 HOURS PRN
Status: DISCONTINUED | OUTPATIENT
Start: 2023-04-12 | End: 2023-04-15 | Stop reason: HOSPADM

## 2023-04-12 RX ORDER — LAMOTRIGINE 25 MG/1
1 TABLET ORAL EVERY 12 HOURS SCHEDULED
Status: ON HOLD | COMMUNITY
Start: 2023-03-09 | End: 2023-04-12

## 2023-04-12 RX ORDER — DOXAZOSIN 2 MG/1
2 TABLET ORAL NIGHTLY
COMMUNITY
Start: 2023-03-16

## 2023-04-12 RX ORDER — POLYETHYLENE GLYCOL 3350 17 G/17G
17 POWDER, FOR SOLUTION ORAL DAILY PRN
Status: DISCONTINUED | OUTPATIENT
Start: 2023-04-12 | End: 2023-04-15 | Stop reason: HOSPADM

## 2023-04-12 RX ORDER — CYCLOBENZAPRINE HCL 10 MG
10 TABLET ORAL 3 TIMES DAILY PRN
COMMUNITY
Start: 2023-04-10

## 2023-04-12 RX ORDER — BISACODYL 10 MG
10 SUPPOSITORY, RECTAL RECTAL DAILY PRN
Status: DISCONTINUED | OUTPATIENT
Start: 2023-04-12 | End: 2023-04-15 | Stop reason: HOSPADM

## 2023-04-12 RX ORDER — DIVALPROEX SODIUM 250 MG/1
1 TABLET, EXTENDED RELEASE ORAL DAILY
Status: ON HOLD | COMMUNITY
Start: 2023-04-06 | End: 2023-04-12

## 2023-04-12 RX ORDER — HYDROCODONE BITARTRATE AND ACETAMINOPHEN 7.5; 325 MG/1; MG/1
1 TABLET ORAL EVERY 4 HOURS PRN
Status: DISCONTINUED | OUTPATIENT
Start: 2023-04-12 | End: 2023-04-15 | Stop reason: HOSPADM

## 2023-04-12 RX ORDER — BISACODYL 5 MG/1
5 TABLET, DELAYED RELEASE ORAL DAILY PRN
Status: DISCONTINUED | OUTPATIENT
Start: 2023-04-12 | End: 2023-04-15 | Stop reason: HOSPADM

## 2023-04-12 RX ORDER — ONDANSETRON 2 MG/ML
4 INJECTION INTRAMUSCULAR; INTRAVENOUS EVERY 4 HOURS PRN
Status: DISCONTINUED | OUTPATIENT
Start: 2023-04-12 | End: 2023-04-13 | Stop reason: SDUPTHER

## 2023-04-12 RX ORDER — ARIPIPRAZOLE 2 MG/1
1 TABLET ORAL DAILY
Status: ON HOLD | COMMUNITY
End: 2023-04-12

## 2023-04-12 RX ORDER — FAMOTIDINE 20 MG/1
20 TABLET, FILM COATED ORAL
Status: DISCONTINUED | OUTPATIENT
Start: 2023-04-12 | End: 2023-04-13

## 2023-04-12 RX ORDER — HYDROCODONE BITARTRATE AND ACETAMINOPHEN 7.5; 325 MG/1; MG/1
2 TABLET ORAL EVERY 4 HOURS PRN
Status: DISCONTINUED | OUTPATIENT
Start: 2023-04-12 | End: 2023-04-14

## 2023-04-12 RX ORDER — CHOLECALCIFEROL (VITAMIN D3) 125 MCG
5 CAPSULE ORAL NIGHTLY PRN
Status: DISCONTINUED | OUTPATIENT
Start: 2023-04-12 | End: 2023-04-15 | Stop reason: HOSPADM

## 2023-04-12 RX ORDER — CALCIUM CARBONATE 200(500)MG
2 TABLET,CHEWABLE ORAL 2 TIMES DAILY PRN
Status: DISCONTINUED | OUTPATIENT
Start: 2023-04-12 | End: 2023-04-15 | Stop reason: HOSPADM

## 2023-04-12 RX ORDER — HYDROXYZINE HYDROCHLORIDE 25 MG/1
25 TABLET, FILM COATED ORAL 3 TIMES DAILY PRN
Status: DISCONTINUED | OUTPATIENT
Start: 2023-04-12 | End: 2023-04-13

## 2023-04-12 RX ORDER — ACETAMINOPHEN 325 MG/1
650 TABLET ORAL EVERY 4 HOURS PRN
Status: DISCONTINUED | OUTPATIENT
Start: 2023-04-12 | End: 2023-04-15 | Stop reason: HOSPADM

## 2023-04-12 RX ORDER — AMOXICILLIN 250 MG
2 CAPSULE ORAL 2 TIMES DAILY
Status: DISCONTINUED | OUTPATIENT
Start: 2023-04-12 | End: 2023-04-15 | Stop reason: HOSPADM

## 2023-04-12 RX ADMIN — SENNOSIDES AND DOCUSATE SODIUM 2 TABLET: 8.6; 5 TABLET ORAL at 20:08

## 2023-04-12 RX ADMIN — HYDROCODONE BITARTRATE AND ACETAMINOPHEN 2 TABLET: 7.5; 325 TABLET ORAL at 20:08

## 2023-04-12 RX ADMIN — FAMOTIDINE 20 MG: 20 TABLET, FILM COATED ORAL at 17:54

## 2023-04-12 RX ADMIN — ONDANSETRON 4 MG: 2 INJECTION INTRAMUSCULAR; INTRAVENOUS at 21:53

## 2023-04-12 RX ADMIN — PROPRANOLOL HYDROCHLORIDE 60 MG: 40 TABLET ORAL at 21:53

## 2023-04-12 NOTE — H&P
[facility]  [facility]   Patient Name: Kimberly Landers  : 1955  MRN: [id number]  Primary Care Physician:  Juliana Dial APRN  Date of admission: 2023    Subjective   Subjective     Chief Complaint: Leg pain    HPI:    Kimberly Landers is a 67 y.o. female PMH GERD, hypertension, migraine headaches who presented to [facility] after a fall at home.  She states she was walking through her living room, tripped on her rug and sustained a mechanical fall.  Did not lose consciousness, did not lose her head.  She noticed immediate 10/10 left hip pain.  She presented to [place] ED where she was found to have a left subcapital femoral neck fracture.  Case discussed with Dr. Nunez and orthopedic surgery at [facility] who has agreed to consult on the patient.  She was transferred to our facility.  Currently, her pain is controlled if she does not move her leg.  She does complain of a headache.     Personal History     Past Medical History:  Past Medical History:   Diagnosis Date   • Chronic kidney disease    • GERD (gastroesophageal reflux disease)    • Hypertension        Past Surgical History:  Past Surgical History:   Procedure Laterality Date   • COLONOSCOPY     • COLONOSCOPY N/A 2022    Procedure: COLONOSCOPY WITH BX;  Surgeon: Gordo Lange MD;  Location: [facility] ENDOSCOPY;  Service: Gastroenterology;  Laterality: N/A;  DIVERTICULOSIS   • DILATATION AND CURETTAGE      x3   • ENDOSCOPY N/A 2022    Procedure: ESOPHAGOGASTRODUODENOSCOPY WITH BX;  Surgeon: Gordo Lange MD;  Location: [facility] ENDOSCOPY;  Service: Gastroenterology;  Laterality: N/A;  NORMAL EGD       Family History:   Family History   Problem Relation Age of Onset   • Cancer Mother    • Colon cancer Neg Hx    • Malig Hyperthermia Neg Hx         Social History:   Social History     Socioeconomic History   • Marital status: Single   Tobacco Use   • Smoking  status: Never     Passive exposure: Yes   • Smokeless tobacco: Never   Vaping Use   • Vaping Use: Never used   Substance and Sexual Activity   • Alcohol use: Never   • Drug use: Never   • Sexual activity: Defer        Home Medications:  Cholecalciferol, Magnesium, amLODIPine, cyanocobalamin, cycloSPORINE (PF), fluticasone, hydrOXYzine, hydroCHLOROthiazide, lisinopril, omeprazole, ondansetron ODT, propranolol, and sacubitril-valsartan    Allergies:  Allergies   Allergen Reactions   • Alprazolam Shortness Of Breath   • Propoxyphene Nausea And Vomiting     Hot flashes and sweaty     • Codeine Nausea And Vomiting       Review of Systems   A 14 point review of systems was obtained and otherwise negative unless stated in the HPI    Objective   Objective     Vitals:   Temp:  [98.5 °F (36.9 °C)] 98.5 °F (36.9 °C)  Heart Rate:  [74] 74  Resp:  [17] 17  BP: (139)/(68) 139/68    Physical Exam    Constitutional: Awake, alert, no acute distress   HENT: NCAT, mucous membranes moist   Respiratory: Clear to auscultation bilaterally, nonlabored respirations    Cardiovascular: RRR, MRG   Gastrointestinal: Positive bowel sounds, soft, nontender, nondistended   Musculoskeletal: No bilateral ankle edema, no clubbing or cyanosis to extremities   Psychiatric: Appropriate affect, cooperative   Neurologic: Oriented x 3, strength symmetric in all extremities, Cranial Nerves grossly intact to confrontation, speech clear   Skin: Left leg shortened and externally rotated, tender to palpation    Result Review    Result Review:  I have personally reviewed the results from the time of this admission to 4/12/2023 17:05 EDT and agree with these findings:  [x]  Laboratory personally reviewed CBC, BMP, magnesium      []  Microbiology  [x]  Radiology outside chest x-ray and CT lumbar spine reviewed  [x]  EKG/Telemetry Telemetry personally reviewed  []  Cardiology/Vascular   []  Pathology  []  Old records  []  Other:      Assessment & Plan   Assessment  / Plan     Assessment/Plan:   Acute left subcapital femoral neck fracture  Mechanical fall causing above  GERD  Hypertension  Migraine headache    Admit to the hospital for work-up and management of the above  Dr. Hwang in orthopedic surgery has been consulted, appreciate assistance  N.p.o. after midnight for likely ORIF  Obtain 2 view hip x-ray  Norco and IV morphine as needed for pain  Patient states she has nausea with narcotics, can give Zofran as needed  Obtain EKG  Continue home propanolol for hypertension  Hold home Prilosec, start famotidine for GERD  Atarax as needed for anxiety  Consult PT/OT postoperatively  Trend renal function and electrolytes with a.m. BMP, magnesium   Trend Hgb and WBC with a.m. CBC    Carmen case with: Bedside RN, orthopedic surgery    DVT prophylaxis:  Mechanical DVT prophylaxis orders are present.    CODE STATUS:    Level Of Support Discussed With: Patient  Code Status (Patient has no pulse and is not breathing): CPR (Attempt to Resuscitate)  Medical Interventions (Patient has pulse or is breathing): Full Support      Electronically signed by Wally Sainz MD, 04/12/23, 5:05 PM EDT.

## 2023-04-13 ENCOUNTER — ANESTHESIA EVENT (OUTPATIENT)
Dept: PERIOP | Facility: HOSPITAL | Age: 68
DRG: 482 | End: 2023-04-13
Payer: MEDICARE

## 2023-04-13 ENCOUNTER — APPOINTMENT (OUTPATIENT)
Dept: GENERAL RADIOLOGY | Facility: HOSPITAL | Age: 68
DRG: 482 | End: 2023-04-13
Payer: MEDICARE

## 2023-04-13 ENCOUNTER — PREP FOR SURGERY (OUTPATIENT)
Dept: OTHER | Facility: HOSPITAL | Age: 68
End: 2023-04-13
Payer: MEDICARE

## 2023-04-13 ENCOUNTER — ANESTHESIA (OUTPATIENT)
Dept: PERIOP | Facility: HOSPITAL | Age: 68
DRG: 482 | End: 2023-04-13
Payer: MEDICARE

## 2023-04-13 DIAGNOSIS — S72.009A FEMORAL NECK FRACTURE: Primary | ICD-10-CM

## 2023-04-13 LAB
ANION GAP SERPL CALCULATED.3IONS-SCNC: 7.3 MMOL/L (ref 5–15)
BASOPHILS # BLD AUTO: 0.02 10*3/MM3 (ref 0–0.2)
BASOPHILS NFR BLD AUTO: 0.3 % (ref 0–1.5)
BUN SERPL-MCNC: 21 MG/DL (ref 8–23)
BUN/CREAT SERPL: 21.4 (ref 7–25)
CALCIUM SPEC-SCNC: 9.4 MG/DL (ref 8.6–10.5)
CHLORIDE SERPL-SCNC: 101 MMOL/L (ref 98–107)
CO2 SERPL-SCNC: 30.7 MMOL/L (ref 22–29)
CREAT SERPL-MCNC: 0.98 MG/DL (ref 0.57–1)
DEPRECATED RDW RBC AUTO: 42.2 FL (ref 37–54)
EGFRCR SERPLBLD CKD-EPI 2021: 63.4 ML/MIN/1.73
EOSINOPHIL # BLD AUTO: 0.33 10*3/MM3 (ref 0–0.4)
EOSINOPHIL NFR BLD AUTO: 4.9 % (ref 0.3–6.2)
ERYTHROCYTE [DISTWIDTH] IN BLOOD BY AUTOMATED COUNT: 12.6 % (ref 12.3–15.4)
GLUCOSE SERPL-MCNC: 113 MG/DL (ref 65–99)
HCT VFR BLD AUTO: 42.1 % (ref 34–46.6)
HGB BLD-MCNC: 13.8 G/DL (ref 12–15.9)
IMM GRANULOCYTES # BLD AUTO: 0.02 10*3/MM3 (ref 0–0.05)
IMM GRANULOCYTES NFR BLD AUTO: 0.3 % (ref 0–0.5)
LYMPHOCYTES # BLD AUTO: 1.67 10*3/MM3 (ref 0.7–3.1)
LYMPHOCYTES NFR BLD AUTO: 24.6 % (ref 19.6–45.3)
MAGNESIUM SERPL-MCNC: 2.1 MG/DL (ref 1.6–2.4)
MCH RBC QN AUTO: 29.9 PG (ref 26.6–33)
MCHC RBC AUTO-ENTMCNC: 32.8 G/DL (ref 31.5–35.7)
MCV RBC AUTO: 91.3 FL (ref 79–97)
MONOCYTES # BLD AUTO: 0.72 10*3/MM3 (ref 0.1–0.9)
MONOCYTES NFR BLD AUTO: 10.6 % (ref 5–12)
NEUTROPHILS NFR BLD AUTO: 4.02 10*3/MM3 (ref 1.7–7)
NEUTROPHILS NFR BLD AUTO: 59.3 % (ref 42.7–76)
NRBC BLD AUTO-RTO: 0 /100 WBC (ref 0–0.2)
PLATELET # BLD AUTO: 149 10*3/MM3 (ref 140–450)
PMV BLD AUTO: 9.5 FL (ref 6–12)
POTASSIUM SERPL-SCNC: 4.3 MMOL/L (ref 3.5–5.2)
RBC # BLD AUTO: 4.61 10*6/MM3 (ref 3.77–5.28)
SODIUM SERPL-SCNC: 139 MMOL/L (ref 136–145)
WBC NRBC COR # BLD: 6.78 10*3/MM3 (ref 3.4–10.8)

## 2023-04-13 PROCEDURE — C1713 ANCHOR/SCREW BN/BN,TIS/BN: HCPCS | Performed by: ORTHOPAEDIC SURGERY

## 2023-04-13 PROCEDURE — 0QS704Z REPOSITION LEFT UPPER FEMUR WITH INTERNAL FIXATION DEVICE, OPEN APPROACH: ICD-10-PCS | Performed by: ORTHOPAEDIC SURGERY

## 2023-04-13 PROCEDURE — 94799 UNLISTED PULMONARY SVC/PX: CPT

## 2023-04-13 PROCEDURE — 99233 SBSQ HOSP IP/OBS HIGH 50: CPT | Performed by: INTERNAL MEDICINE

## 2023-04-13 PROCEDURE — 76000 FLUOROSCOPY <1 HR PHYS/QHP: CPT

## 2023-04-13 PROCEDURE — 27236 TREAT THIGH FRACTURE: CPT | Performed by: ORTHOPAEDIC SURGERY

## 2023-04-13 PROCEDURE — 25010000002 ONDANSETRON PER 1 MG: Performed by: NURSE ANESTHETIST, CERTIFIED REGISTERED

## 2023-04-13 PROCEDURE — 85025 COMPLETE CBC W/AUTO DIFF WBC: CPT | Performed by: INTERNAL MEDICINE

## 2023-04-13 PROCEDURE — 25010000002 CEFAZOLIN IN DEXTROSE 2-4 GM/100ML-% SOLUTION: Performed by: ORTHOPAEDIC SURGERY

## 2023-04-13 PROCEDURE — 73502 X-RAY EXAM HIP UNI 2-3 VIEWS: CPT

## 2023-04-13 PROCEDURE — 25010000002 FENTANYL CITRATE (PF) 50 MCG/ML SOLUTION: Performed by: NURSE ANESTHETIST, CERTIFIED REGISTERED

## 2023-04-13 PROCEDURE — 25010000002 ONDANSETRON PER 1 MG: Performed by: ORTHOPAEDIC SURGERY

## 2023-04-13 PROCEDURE — 80048 BASIC METABOLIC PNL TOTAL CA: CPT | Performed by: INTERNAL MEDICINE

## 2023-04-13 PROCEDURE — 25010000002 PROPOFOL 10 MG/ML EMULSION: Performed by: NURSE ANESTHETIST, CERTIFIED REGISTERED

## 2023-04-13 PROCEDURE — 83735 ASSAY OF MAGNESIUM: CPT | Performed by: INTERNAL MEDICINE

## 2023-04-13 PROCEDURE — 25010000002 ONDANSETRON PER 1 MG: Performed by: INTERNAL MEDICINE

## 2023-04-13 PROCEDURE — 25010000002 MORPHINE PER 10 MG: Performed by: INTERNAL MEDICINE

## 2023-04-13 PROCEDURE — 94761 N-INVAS EAR/PLS OXIMETRY MLT: CPT

## 2023-04-13 PROCEDURE — 25010000002 HYDROMORPHONE 1 MG/ML SOLUTION: Performed by: NURSE ANESTHETIST, CERTIFIED REGISTERED

## 2023-04-13 PROCEDURE — 25010000002 DEXAMETHASONE PER 1 MG: Performed by: NURSE ANESTHETIST, CERTIFIED REGISTERED

## 2023-04-13 PROCEDURE — 99222 1ST HOSP IP/OBS MODERATE 55: CPT | Performed by: ORTHOPAEDIC SURGERY

## 2023-04-13 PROCEDURE — 25010000002 ENOXAPARIN PER 10 MG: Performed by: ORTHOPAEDIC SURGERY

## 2023-04-13 DEVICE — SCRW CANN SD/ST THRD16MM 6.5X80MM: Type: IMPLANTABLE DEVICE | Site: HIP | Status: FUNCTIONAL

## 2023-04-13 DEVICE — SCRW CANN SD/ST THRD16MM 6.5X75MM: Type: IMPLANTABLE DEVICE | Site: HIP | Status: FUNCTIONAL

## 2023-04-13 DEVICE — SCRW CANN SD/ST THRD16MM 6.5X85MM: Type: IMPLANTABLE DEVICE | Site: HIP | Status: FUNCTIONAL

## 2023-04-13 RX ORDER — DEXAMETHASONE SODIUM PHOSPHATE 4 MG/ML
INJECTION, SOLUTION INTRA-ARTICULAR; INTRALESIONAL; INTRAMUSCULAR; INTRAVENOUS; SOFT TISSUE AS NEEDED
Status: DISCONTINUED | OUTPATIENT
Start: 2023-04-13 | End: 2023-04-13 | Stop reason: SURG

## 2023-04-13 RX ORDER — EPHEDRINE SULFATE 50 MG/ML
INJECTION, SOLUTION INTRAVENOUS AS NEEDED
Status: DISCONTINUED | OUTPATIENT
Start: 2023-04-13 | End: 2023-04-13 | Stop reason: SURG

## 2023-04-13 RX ORDER — HYDROXYZINE HYDROCHLORIDE 25 MG/1
25 TABLET, FILM COATED ORAL 3 TIMES DAILY PRN
Status: DISCONTINUED | OUTPATIENT
Start: 2023-04-13 | End: 2023-04-15 | Stop reason: HOSPADM

## 2023-04-13 RX ORDER — DEXMEDETOMIDINE HYDROCHLORIDE 100 UG/ML
INJECTION, SOLUTION INTRAVENOUS AS NEEDED
Status: DISCONTINUED | OUTPATIENT
Start: 2023-04-13 | End: 2023-04-13 | Stop reason: SURG

## 2023-04-13 RX ORDER — TRAMADOL HYDROCHLORIDE 50 MG/1
50 TABLET ORAL EVERY 4 HOURS PRN
Status: DISCONTINUED | OUTPATIENT
Start: 2023-04-13 | End: 2023-04-15 | Stop reason: HOSPADM

## 2023-04-13 RX ORDER — ONDANSETRON 2 MG/ML
4 INJECTION INTRAMUSCULAR; INTRAVENOUS EVERY 6 HOURS PRN
Status: DISCONTINUED | OUTPATIENT
Start: 2023-04-13 | End: 2023-04-15 | Stop reason: HOSPADM

## 2023-04-13 RX ORDER — ROCURONIUM BROMIDE 10 MG/ML
INJECTION, SOLUTION INTRAVENOUS AS NEEDED
Status: DISCONTINUED | OUTPATIENT
Start: 2023-04-13 | End: 2023-04-13 | Stop reason: SURG

## 2023-04-13 RX ORDER — FENTANYL CITRATE 50 UG/ML
INJECTION, SOLUTION INTRAMUSCULAR; INTRAVENOUS AS NEEDED
Status: DISCONTINUED | OUTPATIENT
Start: 2023-04-13 | End: 2023-04-13 | Stop reason: SURG

## 2023-04-13 RX ORDER — KETAMINE HCL IN NACL, ISO-OSM 100MG/10ML
SYRINGE (ML) INJECTION AS NEEDED
Status: DISCONTINUED | OUTPATIENT
Start: 2023-04-13 | End: 2023-04-13 | Stop reason: SURG

## 2023-04-13 RX ORDER — PROMETHAZINE HYDROCHLORIDE 12.5 MG/1
25 TABLET ORAL ONCE AS NEEDED
Status: DISCONTINUED | OUTPATIENT
Start: 2023-04-13 | End: 2023-04-13 | Stop reason: HOSPADM

## 2023-04-13 RX ORDER — SODIUM CHLORIDE, SODIUM LACTATE, POTASSIUM CHLORIDE, CALCIUM CHLORIDE 600; 310; 30; 20 MG/100ML; MG/100ML; MG/100ML; MG/100ML
9 INJECTION, SOLUTION INTRAVENOUS CONTINUOUS PRN
Status: DISCONTINUED | OUTPATIENT
Start: 2023-04-13 | End: 2023-04-13 | Stop reason: HOSPADM

## 2023-04-13 RX ORDER — CEFAZOLIN SODIUM IN 0.9 % NACL 3 G/100 ML
3 INTRAVENOUS SOLUTION, PIGGYBACK (ML) INTRAVENOUS ONCE
Status: CANCELLED | OUTPATIENT
Start: 2023-04-13 | End: 2023-04-13

## 2023-04-13 RX ORDER — SODIUM CHLORIDE 0.9 % (FLUSH) 0.9 %
10 SYRINGE (ML) INJECTION EVERY 12 HOURS SCHEDULED
Status: DISCONTINUED | OUTPATIENT
Start: 2023-04-13 | End: 2023-04-15 | Stop reason: HOSPADM

## 2023-04-13 RX ORDER — CYCLOBENZAPRINE HCL 10 MG
10 TABLET ORAL 3 TIMES DAILY PRN
Status: DISCONTINUED | OUTPATIENT
Start: 2023-04-13 | End: 2023-04-15 | Stop reason: HOSPADM

## 2023-04-13 RX ORDER — ACETAMINOPHEN 500 MG
1000 TABLET ORAL ONCE
Status: COMPLETED | OUTPATIENT
Start: 2023-04-13 | End: 2023-04-13

## 2023-04-13 RX ORDER — SODIUM CHLORIDE 9 MG/ML
40 INJECTION, SOLUTION INTRAVENOUS AS NEEDED
Status: DISCONTINUED | OUTPATIENT
Start: 2023-04-13 | End: 2023-04-15 | Stop reason: HOSPADM

## 2023-04-13 RX ORDER — MEPERIDINE HYDROCHLORIDE 25 MG/ML
12.5 INJECTION INTRAMUSCULAR; INTRAVENOUS; SUBCUTANEOUS
Status: DISCONTINUED | OUTPATIENT
Start: 2023-04-13 | End: 2023-04-13 | Stop reason: HOSPADM

## 2023-04-13 RX ORDER — PANTOPRAZOLE SODIUM 40 MG/1
40 TABLET, DELAYED RELEASE ORAL
Status: DISCONTINUED | OUTPATIENT
Start: 2023-04-13 | End: 2023-04-15 | Stop reason: HOSPADM

## 2023-04-13 RX ORDER — SODIUM CHLORIDE 0.9 % (FLUSH) 0.9 %
10 SYRINGE (ML) INJECTION AS NEEDED
Status: DISCONTINUED | OUTPATIENT
Start: 2023-04-13 | End: 2023-04-15 | Stop reason: HOSPADM

## 2023-04-13 RX ORDER — ONDANSETRON 2 MG/ML
INJECTION INTRAMUSCULAR; INTRAVENOUS AS NEEDED
Status: DISCONTINUED | OUTPATIENT
Start: 2023-04-13 | End: 2023-04-13 | Stop reason: SURG

## 2023-04-13 RX ORDER — TERAZOSIN 1 MG/1
2 CAPSULE ORAL NIGHTLY
Status: DISCONTINUED | OUTPATIENT
Start: 2023-04-13 | End: 2023-04-15 | Stop reason: HOSPADM

## 2023-04-13 RX ORDER — ONDANSETRON 2 MG/ML
4 INJECTION INTRAMUSCULAR; INTRAVENOUS ONCE AS NEEDED
Status: DISCONTINUED | OUTPATIENT
Start: 2023-04-13 | End: 2023-04-13 | Stop reason: HOSPADM

## 2023-04-13 RX ORDER — PHENYLEPHRINE HCL IN 0.9% NACL 1 MG/10 ML
SYRINGE (ML) INTRAVENOUS AS NEEDED
Status: DISCONTINUED | OUTPATIENT
Start: 2023-04-13 | End: 2023-04-13 | Stop reason: SURG

## 2023-04-13 RX ORDER — ENOXAPARIN SODIUM 100 MG/ML
40 INJECTION SUBCUTANEOUS DAILY
Status: DISCONTINUED | OUTPATIENT
Start: 2023-04-13 | End: 2023-04-15 | Stop reason: HOSPADM

## 2023-04-13 RX ORDER — OXYCODONE HYDROCHLORIDE 5 MG/1
5 TABLET ORAL
Status: DISCONTINUED | OUTPATIENT
Start: 2023-04-13 | End: 2023-04-13 | Stop reason: HOSPADM

## 2023-04-13 RX ORDER — CEFAZOLIN SODIUM 2 G/100ML
2 INJECTION, SOLUTION INTRAVENOUS ONCE
Status: COMPLETED | OUTPATIENT
Start: 2023-04-13 | End: 2023-04-13

## 2023-04-13 RX ORDER — MAGNESIUM HYDROXIDE 1200 MG/15ML
LIQUID ORAL AS NEEDED
Status: DISCONTINUED | OUTPATIENT
Start: 2023-04-13 | End: 2023-04-13 | Stop reason: HOSPADM

## 2023-04-13 RX ORDER — NALOXONE HCL 0.4 MG/ML
0.4 VIAL (ML) INJECTION
Status: DISCONTINUED | OUTPATIENT
Start: 2023-04-13 | End: 2023-04-15 | Stop reason: HOSPADM

## 2023-04-13 RX ORDER — CEFAZOLIN SODIUM 2 G/100ML
2 INJECTION, SOLUTION INTRAVENOUS ONCE
Status: CANCELLED | OUTPATIENT
Start: 2023-04-13 | End: 2023-04-13

## 2023-04-13 RX ORDER — MORPHINE SULFATE 2 MG/ML
1 INJECTION, SOLUTION INTRAMUSCULAR; INTRAVENOUS EVERY 4 HOURS PRN
Status: DISCONTINUED | OUTPATIENT
Start: 2023-04-13 | End: 2023-04-15 | Stop reason: HOSPADM

## 2023-04-13 RX ORDER — CELECOXIB 100 MG/1
200 CAPSULE ORAL ONCE
Status: COMPLETED | OUTPATIENT
Start: 2023-04-13 | End: 2023-04-13

## 2023-04-13 RX ORDER — PROMETHAZINE HYDROCHLORIDE 25 MG/1
25 SUPPOSITORY RECTAL ONCE AS NEEDED
Status: DISCONTINUED | OUTPATIENT
Start: 2023-04-13 | End: 2023-04-13 | Stop reason: HOSPADM

## 2023-04-13 RX ORDER — CEFAZOLIN SODIUM IN 0.9 % NACL 3 G/100 ML
3 INTRAVENOUS SOLUTION, PIGGYBACK (ML) INTRAVENOUS ONCE
Status: DISCONTINUED | OUTPATIENT
Start: 2023-04-13 | End: 2023-04-13 | Stop reason: SDUPTHER

## 2023-04-13 RX ORDER — LIDOCAINE HYDROCHLORIDE 20 MG/ML
INJECTION, SOLUTION EPIDURAL; INFILTRATION; INTRACAUDAL; PERINEURAL AS NEEDED
Status: DISCONTINUED | OUTPATIENT
Start: 2023-04-13 | End: 2023-04-13 | Stop reason: SURG

## 2023-04-13 RX ORDER — ONDANSETRON 4 MG/1
4 TABLET, FILM COATED ORAL EVERY 6 HOURS PRN
Status: DISCONTINUED | OUTPATIENT
Start: 2023-04-13 | End: 2023-04-15 | Stop reason: HOSPADM

## 2023-04-13 RX ORDER — SODIUM CHLORIDE 9 MG/ML
100 INJECTION, SOLUTION INTRAVENOUS CONTINUOUS
Status: DISCONTINUED | OUTPATIENT
Start: 2023-04-13 | End: 2023-04-14

## 2023-04-13 RX ORDER — PROPOFOL 10 MG/ML
VIAL (ML) INTRAVENOUS AS NEEDED
Status: DISCONTINUED | OUTPATIENT
Start: 2023-04-13 | End: 2023-04-13 | Stop reason: SURG

## 2023-04-13 RX ORDER — PROCHLORPERAZINE EDISYLATE 5 MG/ML
5 INJECTION INTRAMUSCULAR; INTRAVENOUS EVERY 4 HOURS PRN
Status: DISCONTINUED | OUTPATIENT
Start: 2023-04-13 | End: 2023-04-15 | Stop reason: HOSPADM

## 2023-04-13 RX ADMIN — Medication 200 MCG: at 11:07

## 2023-04-13 RX ADMIN — TERAZOSIN HYDROCHLORIDE 2 MG: 1 CAPSULE ORAL at 20:56

## 2023-04-13 RX ADMIN — Medication 20 MG: at 10:29

## 2023-04-13 RX ADMIN — FENTANYL CITRATE 50 MCG: 50 INJECTION, SOLUTION INTRAMUSCULAR; INTRAVENOUS at 10:29

## 2023-04-13 RX ADMIN — EPHEDRINE SULFATE 10 MG: 50 INJECTION INTRAVENOUS at 11:04

## 2023-04-13 RX ADMIN — FENTANYL CITRATE 50 MCG: 50 INJECTION, SOLUTION INTRAMUSCULAR; INTRAVENOUS at 10:55

## 2023-04-13 RX ADMIN — MORPHINE SULFATE 4 MG: 4 INJECTION, SOLUTION INTRAMUSCULAR; INTRAVENOUS at 09:11

## 2023-04-13 RX ADMIN — ONDANSETRON 4 MG: 2 INJECTION INTRAMUSCULAR; INTRAVENOUS at 04:39

## 2023-04-13 RX ADMIN — ONDANSETRON 4 MG: 2 INJECTION INTRAMUSCULAR; INTRAVENOUS at 11:10

## 2023-04-13 RX ADMIN — ROCURONIUM BROMIDE 50 MG: 10 INJECTION, SOLUTION INTRAVENOUS at 10:30

## 2023-04-13 RX ADMIN — HYDROCODONE BITARTRATE AND ACETAMINOPHEN 2 TABLET: 7.5; 325 TABLET ORAL at 23:43

## 2023-04-13 RX ADMIN — CEFAZOLIN SODIUM 2 G: 2 INJECTION, SOLUTION INTRAVENOUS at 10:36

## 2023-04-13 RX ADMIN — SENNOSIDES AND DOCUSATE SODIUM 2 TABLET: 8.6; 5 TABLET ORAL at 20:57

## 2023-04-13 RX ADMIN — Medication 200 MCG: at 10:31

## 2023-04-13 RX ADMIN — PROPOFOL 150 MG: 10 INJECTION, EMULSION INTRAVENOUS at 10:29

## 2023-04-13 RX ADMIN — ENOXAPARIN SODIUM 40 MG: 100 INJECTION SUBCUTANEOUS at 15:22

## 2023-04-13 RX ADMIN — HYDROMORPHONE HYDROCHLORIDE 0.5 MG: 1 INJECTION, SOLUTION INTRAMUSCULAR; INTRAVENOUS; SUBCUTANEOUS at 12:07

## 2023-04-13 RX ADMIN — Medication 200 MCG: at 11:01

## 2023-04-13 RX ADMIN — Medication 300 MCG: at 10:42

## 2023-04-13 RX ADMIN — EPHEDRINE SULFATE 10 MG: 50 INJECTION INTRAVENOUS at 10:59

## 2023-04-13 RX ADMIN — HYDROCODONE BITARTRATE AND ACETAMINOPHEN 2 TABLET: 7.5; 325 TABLET ORAL at 14:26

## 2023-04-13 RX ADMIN — ONDANSETRON 4 MG: 2 INJECTION INTRAMUSCULAR; INTRAVENOUS at 11:32

## 2023-04-13 RX ADMIN — SUGAMMADEX 200 MG: 100 INJECTION, SOLUTION INTRAVENOUS at 11:10

## 2023-04-13 RX ADMIN — ONDANSETRON 4 MG: 2 INJECTION INTRAMUSCULAR; INTRAVENOUS at 15:22

## 2023-04-13 RX ADMIN — ONDANSETRON 4 MG: 2 INJECTION INTRAMUSCULAR; INTRAVENOUS at 09:11

## 2023-04-13 RX ADMIN — DEXMEDETOMIDINE HYDROCHLORIDE 10 MCG: 100 INJECTION, SOLUTION, CONCENTRATE INTRAVENOUS at 10:46

## 2023-04-13 RX ADMIN — ONDANSETRON 4 MG: 2 INJECTION INTRAMUSCULAR; INTRAVENOUS at 23:45

## 2023-04-13 RX ADMIN — Medication 200 MCG: at 10:50

## 2023-04-13 RX ADMIN — LIDOCAINE HYDROCHLORIDE 60 MG: 20 INJECTION, SOLUTION EPIDURAL; INFILTRATION; INTRACAUDAL; PERINEURAL at 10:29

## 2023-04-13 RX ADMIN — ACETAMINOPHEN 1000 MG: 500 TABLET ORAL at 09:40

## 2023-04-13 RX ADMIN — CELECOXIB 200 MG: 100 CAPSULE ORAL at 09:41

## 2023-04-13 RX ADMIN — PROPRANOLOL HYDROCHLORIDE 60 MG: 40 TABLET ORAL at 20:56

## 2023-04-13 RX ADMIN — SODIUM CHLORIDE 100 ML/HR: 9 INJECTION, SOLUTION INTRAVENOUS at 14:28

## 2023-04-13 RX ADMIN — MORPHINE SULFATE 4 MG: 4 INJECTION, SOLUTION INTRAMUSCULAR; INTRAVENOUS at 04:39

## 2023-04-13 RX ADMIN — DEXAMETHASONE SODIUM PHOSPHATE 4 MG: 4 INJECTION, SOLUTION INTRA-ARTICULAR; INTRALESIONAL; INTRAMUSCULAR; INTRAVENOUS; SOFT TISSUE at 10:30

## 2023-04-13 RX ADMIN — SODIUM CHLORIDE, POTASSIUM CHLORIDE, SODIUM LACTATE AND CALCIUM CHLORIDE: 600; 310; 30; 20 INJECTION, SOLUTION INTRAVENOUS at 10:26

## 2023-04-13 RX ADMIN — SODIUM CHLORIDE, POTASSIUM CHLORIDE, SODIUM LACTATE AND CALCIUM CHLORIDE 9 ML/HR: 600; 310; 30; 20 INJECTION, SOLUTION INTRAVENOUS at 12:12

## 2023-04-13 RX ADMIN — Medication 300 MCG: at 10:59

## 2023-04-13 NOTE — ANESTHESIA POSTPROCEDURE EVALUATION
Patient: Kimberly Landers    Procedure Summary     Date: 04/13/23 Room / Location: [facility] OR 08 / [facility] MAIN OR    Anesthesia Start: 1026 Anesthesia Stop: 1124    Procedure: HIP CANNULATED SCREW PLACEMENT (Left: Hip) Diagnosis:       Femoral neck fracture      (Femoral neck fracture [S72.009A])    Surgeons: Sam Nunez MD Provider: Oliverio Cody MD    Anesthesia Type: general, ERAS Protocol ASA Status: 2          Anesthesia Type: general, ERAS Protocol    Vitals  Vitals Value Taken Time   /44 04/13/23 1213   Temp 36.8 °C (98.3 °F) 04/13/23 1120   Pulse 72 04/13/23 1214   Resp 18 04/13/23 1205   SpO2 95 % 04/13/23 1214   Vitals shown include unvalidated device data.        Post Anesthesia Care and Evaluation    Patient location during evaluation: bedside  Patient participation: complete - patient participated  Level of consciousness: awake  Pain management: adequate    Airway patency: patent  PONV Status: none  Cardiovascular status: acceptable and stable  Respiratory status: acceptable  Hydration status: acceptable    Comments: An Anesthesiologist personally participated in the most demanding procedures (including induction and emergence if applicable) in the anesthesia plan, monitored the course of anesthesia administration at frequent intervals and remained physically present and available for immediate diagnosis and treatment of emergencies.

## 2023-04-13 NOTE — ANESTHESIA PREPROCEDURE EVALUATION
Anesthesia Evaluation     Patient summary reviewed and Nursing notes reviewed   no history of anesthetic complications:  NPO Solid Status: > 8 hours  NPO Liquid Status: > 2 hours           Airway   Mallampati: I  TM distance: >3 FB  Neck ROM: full  No difficulty expected  Dental    (+) upper dentures and lower dentures    Pulmonary - negative pulmonary ROS and normal exam    breath sounds clear to auscultation  Cardiovascular - normal exam  Exercise tolerance: good (4-7 METS)    Rhythm: regular  Rate: normal    (+) hypertension,       Neuro/Psych- negative ROS  GI/Hepatic/Renal/Endo      Musculoskeletal     Abdominal    Substance History      OB/GYN          Other        ROS/Med Hx Other: PAT Nursing Notes unavailable.                   Anesthesia Plan    ASA 2     general and ERAS Protocol     Reason for not using neuraxial anesthesia or peripheral nerve block: Patient Preference  (Discussed GA and Spinal, pt prefers GA)    Anesthetic plan, risks, benefits, and alternatives have been provided, discussed and informed consent has been obtained with: patient.        CODE STATUS:    Level Of Support Discussed With: Patient  Code Status (Patient has no pulse and is not breathing): CPR (Attempt to Resuscitate)  Medical Interventions (Patient has pulse or is breathing): Full Support

## 2023-04-13 NOTE — PLAN OF CARE
Goal Outcome Evaluation:  Plan of Care Reviewed With: patient        Progress: no change    Patient has had no new changes throughout shift and continues to remain stable. Patient NPO since midnight for possible surgery today with Dr. Nunez. Medicated for pain and nausea x2 with relief noted. Mg intact and draining. Continuing POC.

## 2023-04-13 NOTE — PROGRESS NOTES
[facility]   Hospitalist Progress Note  Date: 2023  Patient Name: Kimberly Landers  : 1955  MRN: [id number]  Date of admission: 2023      Subjective   Subjective     Chief Complaint: Hip pain    Summary: 67 y.o. female PMH GERD, hypertension, migraine headaches who presented to [facility] after a fall at home.  She states she was walking through her living room, tripped on her rug and sustained a mechanical fall.  She noticed immediate 10/10 left hip pain.  She presented to [place] ED where she was found to have a left subcapital femoral neck fracture.  Case discussed with Dr. Nunez and orthopedic surgery at [facility] who has agreed to consult on the patient.  She was transferred to our facility.  Orthopedic surgery was consulted and took patient to the OR on  for ORIF.  She tolerated procedure well    Interval Followup: Patient was seen postoperatively, she tolerated the procedure well.  She is experiencing significant nausea and abdominal discomfort.  She is having issues with the pain medications, she states they have always caused her bad nausea.    Review of Systems   Denies fevers or chest pain     Objective   Objective     Vitals:   Temp:  [97.3 °F (36.3 °C)-99.6 °F (37.6 °C)] 97.7 °F (36.5 °C)  Heart Rate:  [62-87] 70  Resp:  [16-18] 16  BP: ()/(44-88) 134/88  Flow (L/min):  [2-3] 2  Physical Exam    Constitutional: Awake, alert, NAD    Respiratory: Clear to auscultation bilaterally, nonlabored respirations    Cardiovascular: RRR, no MRG   Gastrointestinal: Positive bowel sounds, soft, nontender, nondistended   Neurologic: Oriented x 3, strength symmetric in all extremities, Cranial Nerves grossly intact to confrontation, speech clear              Ext: Left hip with dressing that is c/d/i, expected postop ROM    Result Review    Result Review:  I have personally reviewed the results below:  [x]  Laboratory all labs reviewed  []  Microbiology  []   Radiology  [x]  EKG/Telemetry   []  Cardiology/Vascular   []  Pathology  []  Old records  []  Other:  CBC        4/12/2023    17:54 4/13/2023    04:12   CBC   WBC 7.22   6.78     RBC 4.59   4.61     Hemoglobin 13.6   13.8     Hematocrit 41.4   42.1     MCV 90.2   91.3     MCH 29.6   29.9     MCHC 32.9   32.8     RDW 12.5   12.6     Platelets 147   149       CMP        4/12/2023    17:54 4/13/2023    04:12   CMP   Glucose 105   113     BUN 18   21     Creatinine 0.92   0.98     EGFR 68.4   63.4     Sodium 137   139     Potassium 4.1   4.3     Chloride 100   101     Calcium 8.9   9.4     Total Protein 6.2      Albumin 3.5      Globulin 2.7      Total Bilirubin 0.8      Alkaline Phosphatase 61      AST (SGOT) 21      ALT (SGPT) 22      Albumin/Globulin Ratio 1.3      BUN/Creatinine Ratio 19.6   21.4     Anion Gap 9.2   7.3         Assessment & Plan   Assessment / Plan     Assessment/Plan:  Acute left subcapital femoral neck fracture status post ORIF  Mechanical fall causing above  GERD  Hypertension  Migraine headache     Continue to monitor in the hospital for work-up and management of the above  Dr. Nunez in orthopedic surgery following, appreciate assistance  Personally reviewed hip x-ray and discussed orthopedic surgery, patient has impacted subcapital left femoral neck fracture  Discussed with orthopedic surgery this morning, elected to proceed with surgical intervention today  Status post OR today for ORIF  Perioperative fluids ordered  Continue Norco and IV morphine as needed for pain  Add tramadol to see if this causes less nausea  Continue IV Zofran, add Compazine as needed for nausea/vomiting  Continue home propanolol for hypertension  Restart home Prilosec daily  Atarax as needed for anxiety  Consult PT/OT/social work for aid with disposition  Trend renal function and electrolytes with a.m. BMP, magnesium   Trend Hgb and WBC with a.m. CBC     Discussed case with: Bedside RN, orthopedic surgery    DVT  prophylaxis:  Medical and mechanical DVT prophylaxis orders are present.    CODE STATUS:   Level Of Support Discussed With: Patient  Code Status (Patient has no pulse and is not breathing): CPR (Attempt to Resuscitate)  Medical Interventions (Patient has pulse or is breathing): Full Support      Electronically signed by Wally Sainz MD, 04/13/23, 3:34 PM EDT.     The patient needs a bedside commode at home for the following reason: the patient is confined to one level of the home environment and there is no toilet on that level.

## 2023-04-13 NOTE — CASE MANAGEMENT/SOCIAL WORK
Discharge Planning Assessment   So     Patient Name: Kimberly Landers  MRN: [id number]  Today's Date: 4/13/2023    Admit Date: 4/12/2023     Discharge Needs Assessment     Row Name 04/13/23 1530       Living Environment    People in Home alone    Unique Family Situation Pt lives in [place] alone.    Current Living Arrangements apartment    Primary Care Provided by self    Provides Primary Care For no one    Quality of Family Relationships supportive    Able to Return to Prior Arrangements yes       Resource/Environmental Concerns    Resource/Environmental Concerns none       Transition Planning    Patient/Family Anticipates Transition to home with help/services    Patient/Family Anticipated Services at Transition durable medical equipment;home health care    Transportation Anticipated family or friend will provide;car, drives self       Discharge Needs Assessment    Readmission Within the Last 30 Days no previous admission in last 30 days    Equipment Currently Used at Home rollator    Concerns to be Addressed basic needs;discharge planning    Anticipated Changes Related to Illness none    Equipment Needed After Discharge commode;walker, rolling    Outpatient/Agency/Support Group Needs homecare agency    Discharge Facility/Level of Care Needs home with home health               Discharge Plan     Row Name 04/13/23 1680       Plan    Plan Pt admitted with hip fracture- taken to surgery today. SW spoke with pt to assess needs. Pt lives at home alone and is normally independent in ADLs. Discussed rehab needs and pt is interested in [facility] at this time, no preference. Pt requests a walker and 3in1 at discharge- [employer] notified. Pt has family/friends who live nearby and can assist as needed. No additional needs noted at this time.    Patient/Family in Agreement with Plan yes    Final Discharge Disposition Code 06 - home with home health care              Continued Care and Services - Admitted Since 4/12/2023      Home Medical Care     Service Provider Request Status Selected Services Address Phone Fax Patient Preferred    [facility] Pending - Request Sent N/A [id number] [place] [id number] [phone] --               Demographic Summary     Row Name 04/13/23 1529       General Information    Admission Type inpatient    Arrived From emergency department    Referral Source admission list    Reason for Consult discharge planning    Preferred Language English               Functional Status     Row Name 04/13/23 1529       Functional Status    Usual Activity Tolerance good    Current Activity Tolerance good       Functional Status, IADL    Medications independent    Meal Preparation independent    Housekeeping independent    Laundry independent    Shopping independent       Mental Status    General Appearance WDL WDL       Mental Status Summary    Recent Changes in Mental Status/Cognitive Functioning no changes               Psychosocial     Row Name 04/13/23 1530       Behavior WDL    Behavior WDL WDL       Emotion Mood WDL    Emotion/Mood/Affect WDL WDL       Speech WDL    Speech WDL WDL       Perceptual State WDL    Perceptual State WDL WDL       Thought Process WDL    Thought Process WDL WDL       Intellectual Performance WDL    Intellectual Performance WDL WDL    Level of Consciousness Alert       Coping/Stress    Sources of Support sibling(s)    Techniques to [place] with Loss/Stress/Change not applicable    Reaction to Health Status accepting    Understanding of Condition and Treatment adequate understanding of medical condition       Developmental Stage (Eriksson's)    Developmental Stage Stage 7 (35-65 years/Middle Adulthood) Generativity vs. Stagnation              DREA Rizvi

## 2023-04-13 NOTE — CONSULTS
[facility]   Consult Note    Patient Name: Kimberly Landers  : 1955  MRN: [id number]  Primary Care Physician:  Juliana Dial APRN  Referring Physician: ELLA Dias  Date of admission: 2023    Subjective   Subjective     Reason for Consult/ Chief Complaint: Left hip fracture    HPI:  Kimberly Landers is a 67 y.o. female who sustained a left hip fracture after falling at home.  She was at [facility] and she requested a transfer to our hospital.  She was admitted and cleared for surgical invention by hospitalist and diagnosed with a nondisplaced femoral neck fracture    Review of Systems   14 Point ROS is negative except as noted above.     Personal History     Past Medical History:   Diagnosis Date   • Chronic kidney disease    • GERD (gastroesophageal reflux disease)    • Hypertension        Past Surgical History:   Procedure Laterality Date   • COLONOSCOPY     • COLONOSCOPY N/A 2022    Procedure: COLONOSCOPY WITH BX;  Surgeon: Gordo Lange MD;  Location: [facility] ENDOSCOPY;  Service: Gastroenterology;  Laterality: N/A;  DIVERTICULOSIS   • DILATATION AND CURETTAGE      x3   • ENDOSCOPY N/A 2022    Procedure: ESOPHAGOGASTRODUODENOSCOPY WITH BX;  Surgeon: Gordo Lange MD;  Location: [facility] ENDOSCOPY;  Service: Gastroenterology;  Laterality: N/A;  NORMAL EGD       Family History: family history includes Cancer in her mother. Otherwise pertinent FHx was reviewed and not pertinent to current issue.    Social History:  reports that she has never smoked. She has been exposed to tobacco smoke. She has never used smokeless tobacco. She reports that she does not drink alcohol and does not use drugs.    Home Medications:  Magnesium, aspirin, cycloSPORINE (PF), cyclobenzaprine, doxazosin, hydrOXYzine, omeprazole, ondansetron ODT, and propranolol    Allergies:  Allergies   Allergen Reactions   • Alprazolam Shortness Of Breath   • Propoxyphene Nausea And Vomiting      Hot flashes and sweaty     • Codeine Nausea And Vomiting       Objective    Objective     Vitals:   Temp:  [98.2 °F (36.8 °C)-99.3 °F (37.4 °C)] 99.3 °F (37.4 °C)  Heart Rate:  [68-86] 86  Resp:  [16-17] 16  BP: (108-139)/(54-72) 135/72    Physical Exam:   Constitutional: Awake, alert   HENT: Atraumatic, Normocephalic   Respiratory: Nonlabored respirations    Cardiovascular: Intact peripheral pulses    Musculoskeletal: Clavicles shoulders elbows wrist and hands are all nontender full range of motion her left and right lower extremities are normal compared to others no shortening EXTR or external rotation.  She has groin pain with any internal or external Tatian of her left hip bilateral knees and ankles nontender full range of motion     Imaging:  Imaging Results (Last 24 Hours)     Procedure Component Value Units Date/Time    XR Hip With or Without Pelvis 2 - 3 View Left [[id number]] Collected: 04/12/23 1735     Updated: 04/12/23 1738    Narrative:      PROCEDURE: XR HIP W OR WO PELVIS 2-3 VIEW LEFT     COMPARISON: None     INDICATIONS: fall left hip pain     FINDINGS:   There is a nondisplaced impacted subcapital left femoral neck fracture.  There is no dislocation.    There is no evidence of right hip fracture.  The SI joints and pubic symphysis appear normally   aligned.       Impression:         1. Nondisplaced impacted left subcapital femoral neck fracture.                 PERRY LEMUS MD         Electronically Signed and Approved By: PERRY LEMUS MD on 4/12/2023 at 17:35                            Result Review    Result Review:  I have personally reviewed the results from the time of this admission to 4/13/2023 09:32 EDT and agree with these findings:  []  Laboratory  []  Microbiology  []  Radiology  []  EKG/Telemetry   []  Cardiology/Vascular   []  Pathology  []  Old records  []  Other:      Assessment & Plan   Assessment / Plan     Brief Patient Summary:  Kimberly Landers is a 67 y.o. female who  with a nondisplaced left femoral neck fracture    Active Hospital Problems:  Active Hospital Problems    Diagnosis    • **Closed left hip fracture    • Femoral neck fracture        Plan: Discussed with her the risk and benefits of proceeding with fixation bleeding infection death blood clots lung problems heart attacks possible need for future surgery possible damage neurovascular structures among others including possibility of needing a total hip replacement hardware removal 1 day she understands the risk and wished to proceed      Electronically signed by Sam Nunez MD, 04/13/23, 9:32 AM EDT.

## 2023-04-13 NOTE — PLAN OF CARE
Problem: Adult Inpatient Plan of Care  Goal: Plan of Care Review  Outcome: Ongoing, Progressing  Goal: Patient-Specific Goal (Individualized)  Outcome: Ongoing, Progressing  Goal: Absence of Hospital-Acquired Illness or Injury  Outcome: Ongoing, Progressing  Intervention: Identify and Manage Fall Risk  Recent Flowsheet Documentation  Taken 4/13/2023 1420 by Negrita Del Castillo RN  Safety Promotion/Fall Prevention: safety round/check completed  Taken 4/13/2023 1245 by Negrita Del Castillo RN  Safety Promotion/Fall Prevention: safety round/check completed  Taken 4/13/2023 1241 by Negrita Del Castillo RN  Safety Promotion/Fall Prevention: safety round/check completed  Taken 4/13/2023 1030 by Negrita Del Castillo RN  Safety Promotion/Fall Prevention: patient off unit  Taken 4/13/2023 0830 by Negrita Del Castillo RN  Safety Promotion/Fall Prevention: safety round/check completed  Taken 4/13/2023 0730 by Negrita Del Castillo RN  Safety Promotion/Fall Prevention: safety round/check completed  Intervention: Prevent Skin Injury  Recent Flowsheet Documentation  Taken 4/13/2023 1240 by Negrita Del Castillo RN  Skin Protection:   adhesive use limited   tubing/devices free from skin contact  Taken 4/13/2023 0730 by Negrita Del Castillo RN  Body Position: legs elevated  Skin Protection:   adhesive use limited   tubing/devices free from skin contact  Intervention: Prevent and Manage VTE (Venous Thromboembolism) Risk  Recent Flowsheet Documentation  Taken 4/13/2023 1240 by Negrita Del Castillo RN  Range of Motion: ROM (range of motion) performed  Taken 4/13/2023 0730 by Negrita Del Castillo RN  Activity Management: back to bed  VTE Prevention/Management:   bilateral   sequential compression devices on  Range of Motion: active ROM (range of motion) encouraged  Intervention: Prevent Infection  Recent Flowsheet Documentation  Taken 4/13/2023 1240 by Negrita Del Castillo RN  Infection Prevention:   environmental surveillance performed   hand hygiene promoted    rest/sleep promoted   single patient room provided   visitors restricted/screened  Taken 4/13/2023 0730 by Negrita Del Castillo RN  Infection Prevention:   environmental surveillance performed   hand hygiene promoted   rest/sleep promoted   visitors restricted/screened   single patient room provided  Goal: Optimal Comfort and Wellbeing  Outcome: Ongoing, Progressing  Intervention: Monitor Pain and Promote Comfort  Recent Flowsheet Documentation  Taken 4/13/2023 1456 by Negrita Del Castillo RN  Pain Management Interventions:   see MAR   quiet environment facilitated   care clustered   cold applied  Taken 4/13/2023 1240 by Negrita Del Castillo RN  Pain Management Interventions:   see MAR   quiet environment facilitated   care clustered   cold applied  Taken 4/13/2023 0730 by Negrita Del Castillo RN  Pain Management Interventions:   see MAR   quiet environment facilitated   care clustered  Intervention: Provide Person-Centered Care  Recent Flowsheet Documentation  Taken 4/13/2023 1240 by Negrita Del Castillo RN  Trust Relationship/Rapport:   care explained   emotional support provided   questions answered   thoughts/feelings acknowledged  Taken 4/13/2023 0730 by Negrita Del Castillo RN  Trust Relationship/Rapport:   care explained   emotional support provided   questions answered   thoughts/feelings acknowledged  Goal: Readiness for Transition of Care  Outcome: Ongoing, Progressing     Problem: Pain Acute  Goal: Acceptable Pain Control and Functional Ability  Outcome: Ongoing, Progressing  Intervention: Prevent or Manage Pain  Recent Flowsheet Documentation  Taken 4/13/2023 1420 by Negrita Del Castillo RN  Medication Review/Management: medications reviewed  Taken 4/13/2023 1245 by Negrita Del Castillo RN  Medication Review/Management: medications reviewed  Taken 4/13/2023 1241 by Negrita Del Castillo RN  Medication Review/Management: medications reviewed  Taken 4/13/2023 1240 by Negrita Del Castillo RN  Sensory Stimulation Regulation: care  clustered  Bowel Elimination Promotion:   adequate fluid intake promoted   ambulation promoted  Sleep/Rest Enhancement: awakenings minimized  Medication Review/Management: medications reviewed  Taken 4/13/2023 0830 by Negrita Del Castillo RN  Medication Review/Management: medications reviewed  Taken 4/13/2023 0730 by Negrita Del Castillo RN  Sensory Stimulation Regulation: care clustered  Sleep/Rest Enhancement: awakenings minimized  Medication Review/Management: medications reviewed  Intervention: Develop Pain Management Plan  Recent Flowsheet Documentation  Taken 4/13/2023 1456 by Negrita Del Castillo RN  Pain Management Interventions:   see MAR   quiet environment facilitated   care clustered   cold applied  Taken 4/13/2023 1240 by Negrita Del Castillo RN  Pain Management Interventions:   see MAR   quiet environment facilitated   care clustered   cold applied  Taken 4/13/2023 0730 by Negrita Del Castillo RN  Pain Management Interventions:   see MAR   quiet environment facilitated   care clustered  Intervention: Optimize Psychosocial Wellbeing  Recent Flowsheet Documentation  Taken 4/13/2023 1240 by Negrita Del Castillo RN  Supportive Measures: active listening utilized  Diversional Activities:   smartphone   television  Taken 4/13/2023 0730 by Negrita Del Castillo RN  Supportive Measures: active listening utilized  Diversional Activities:   television   smartphone     Problem: Fall Injury Risk  Goal: Absence of Fall and Fall-Related Injury  Outcome: Ongoing, Progressing  Intervention: Identify and Manage Contributors  Recent Flowsheet Documentation  Taken 4/13/2023 1420 by Negrita Del Castillo RN  Medication Review/Management: medications reviewed  Taken 4/13/2023 1245 by Negrita Del Castillo RN  Medication Review/Management: medications reviewed  Taken 4/13/2023 1241 by Negrita Del Castillo RN  Medication Review/Management: medications reviewed  Taken 4/13/2023 1240 by Negrita Del Castillo RN  Medication Review/Management: medications  reviewed  Self-Care Promotion: independence encouraged  Taken 4/13/2023 0830 by Negrita Del Castillo RN  Medication Review/Management: medications reviewed  Taken 4/13/2023 0730 by Negrita Del Castillo RN  Medication Review/Management: medications reviewed  Self-Care Promotion: independence encouraged  Intervention: Promote Injury-Free Environment  Recent Flowsheet Documentation  Taken 4/13/2023 1420 by Negrita Del Castillo RN  Safety Promotion/Fall Prevention: safety round/check completed  Taken 4/13/2023 1245 by Negrita Del Castillo RN  Safety Promotion/Fall Prevention: safety round/check completed  Taken 4/13/2023 1241 by Negrita Del Castillo RN  Safety Promotion/Fall Prevention: safety round/check completed  Taken 4/13/2023 1030 by Negrita Del Castillo RN  Safety Promotion/Fall Prevention: patient off unit  Taken 4/13/2023 0830 by Negrita Del Castillo RN  Safety Promotion/Fall Prevention: safety round/check completed  Taken 4/13/2023 0730 by Negrita Del Castillo RN  Safety Promotion/Fall Prevention: safety round/check completed     Problem: Skin Injury Risk Increased  Goal: Skin Health and Integrity  Outcome: Ongoing, Progressing  Intervention: Optimize Skin Protection  Recent Flowsheet Documentation  Taken 4/13/2023 1240 by Negrita Del Castillo RN  Pressure Reduction Techniques:   weight shift assistance provided   sit time limited to 2 hours  Pressure Reduction Devices:   pressure-redistributing mattress utilized   positioning supports utilized   heel offloading device utilized  Skin Protection:   adhesive use limited   tubing/devices free from skin contact  Taken 4/13/2023 0730 by Negrita Del Castillo RN  Pressure Reduction Techniques:   weight shift assistance provided   sit time limited to 2 hours  Head of Bed (HOB) Positioning: HOB elevated  Pressure Reduction Devices:   pressure-redistributing mattress utilized   positioning supports utilized   heel offloading device utilized  Skin Protection:   adhesive use limited   tubing/devices  free from skin contact     Problem: Hypertension Comorbidity  Goal: Blood Pressure in Desired Range  Outcome: Ongoing, Progressing  Intervention: Maintain Blood Pressure Management  Recent Flowsheet Documentation  Taken 4/13/2023 1420 by Negrita Del Castillo RN  Medication Review/Management: medications reviewed  Taken 4/13/2023 1245 by Negrita Del Castillo RN  Medication Review/Management: medications reviewed  Taken 4/13/2023 1241 by Negrita Del Castillo RN  Medication Review/Management: medications reviewed  Taken 4/13/2023 1240 by Negrita Del Castillo RN  Medication Review/Management: medications reviewed  Taken 4/13/2023 0830 by Negrita Del Castillo RN  Medication Review/Management: medications reviewed  Taken 4/13/2023 0730 by Negrita Del Castillo RN  Medication Review/Management: medications reviewed   Goal Outcome Evaluation:   Pt has had no new changes throughout shift and continues to remain stable. Pt had left cannulated screwed placed today by Dr. Nunez. Pt has had complaints of pain and nausea throughout shift. Pt is partial WB to affected extremity. Pt is on 2 L NC with the continuous pulse ox. Pt would like to go home with home health when ready for DC. Pt will need DME walker and 3 in 1.

## 2023-04-14 LAB
ANION GAP SERPL CALCULATED.3IONS-SCNC: 10 MMOL/L (ref 5–15)
BASOPHILS # BLD AUTO: 0.01 10*3/MM3 (ref 0–0.2)
BASOPHILS NFR BLD AUTO: 0.2 % (ref 0–1.5)
BUN SERPL-MCNC: 17 MG/DL (ref 8–23)
BUN/CREAT SERPL: 26.6 (ref 7–25)
CALCIUM SPEC-SCNC: 8.8 MG/DL (ref 8.6–10.5)
CHLORIDE SERPL-SCNC: 103 MMOL/L (ref 98–107)
CO2 SERPL-SCNC: 25 MMOL/L (ref 22–29)
CREAT SERPL-MCNC: 0.64 MG/DL (ref 0.57–1)
DEPRECATED RDW RBC AUTO: 41.8 FL (ref 37–54)
EGFRCR SERPLBLD CKD-EPI 2021: 97 ML/MIN/1.73
EOSINOPHIL # BLD AUTO: 0.01 10*3/MM3 (ref 0–0.4)
EOSINOPHIL NFR BLD AUTO: 0.2 % (ref 0.3–6.2)
ERYTHROCYTE [DISTWIDTH] IN BLOOD BY AUTOMATED COUNT: 12.1 % (ref 12.3–15.4)
GLUCOSE SERPL-MCNC: 137 MG/DL (ref 65–99)
HCT VFR BLD AUTO: 37.5 % (ref 34–46.6)
HGB BLD-MCNC: 12.3 G/DL (ref 12–15.9)
IMM GRANULOCYTES # BLD AUTO: 0.03 10*3/MM3 (ref 0–0.05)
IMM GRANULOCYTES NFR BLD AUTO: 0.5 % (ref 0–0.5)
LYMPHOCYTES # BLD AUTO: 0.76 10*3/MM3 (ref 0.7–3.1)
LYMPHOCYTES NFR BLD AUTO: 11.9 % (ref 19.6–45.3)
MAGNESIUM SERPL-MCNC: 1.9 MG/DL (ref 1.6–2.4)
MCH RBC QN AUTO: 30.4 PG (ref 26.6–33)
MCHC RBC AUTO-ENTMCNC: 32.8 G/DL (ref 31.5–35.7)
MCV RBC AUTO: 92.6 FL (ref 79–97)
MONOCYTES # BLD AUTO: 0.57 10*3/MM3 (ref 0.1–0.9)
MONOCYTES NFR BLD AUTO: 8.9 % (ref 5–12)
NEUTROPHILS NFR BLD AUTO: 4.99 10*3/MM3 (ref 1.7–7)
NEUTROPHILS NFR BLD AUTO: 78.3 % (ref 42.7–76)
NRBC BLD AUTO-RTO: 0 /100 WBC (ref 0–0.2)
PHOSPHATE SERPL-MCNC: 3.5 MG/DL (ref 2.5–4.5)
PLATELET # BLD AUTO: 133 10*3/MM3 (ref 140–450)
PMV BLD AUTO: 10.1 FL (ref 6–12)
POTASSIUM SERPL-SCNC: 4.6 MMOL/L (ref 3.5–5.2)
QT INTERVAL: 368 MS
RBC # BLD AUTO: 4.05 10*6/MM3 (ref 3.77–5.28)
SODIUM SERPL-SCNC: 138 MMOL/L (ref 136–145)
WBC NRBC COR # BLD: 6.37 10*3/MM3 (ref 3.4–10.8)

## 2023-04-14 PROCEDURE — 83735 ASSAY OF MAGNESIUM: CPT | Performed by: INTERNAL MEDICINE

## 2023-04-14 PROCEDURE — 97165 OT EVAL LOW COMPLEX 30 MIN: CPT

## 2023-04-14 PROCEDURE — 80048 BASIC METABOLIC PNL TOTAL CA: CPT | Performed by: INTERNAL MEDICINE

## 2023-04-14 PROCEDURE — 25010000002 ONDANSETRON PER 1 MG: Performed by: ORTHOPAEDIC SURGERY

## 2023-04-14 PROCEDURE — 84100 ASSAY OF PHOSPHORUS: CPT | Performed by: INTERNAL MEDICINE

## 2023-04-14 PROCEDURE — 25010000002 PROCHLORPERAZINE 10 MG/2ML SOLUTION: Performed by: INTERNAL MEDICINE

## 2023-04-14 PROCEDURE — 25010000002 ENOXAPARIN PER 10 MG: Performed by: ORTHOPAEDIC SURGERY

## 2023-04-14 PROCEDURE — 99232 SBSQ HOSP IP/OBS MODERATE 35: CPT | Performed by: INTERNAL MEDICINE

## 2023-04-14 PROCEDURE — 97161 PT EVAL LOW COMPLEX 20 MIN: CPT

## 2023-04-14 PROCEDURE — 85025 COMPLETE CBC W/AUTO DIFF WBC: CPT | Performed by: INTERNAL MEDICINE

## 2023-04-14 PROCEDURE — 97116 GAIT TRAINING THERAPY: CPT

## 2023-04-14 RX ORDER — HYDROCODONE BITARTRATE AND ACETAMINOPHEN 5; 325 MG/1; MG/1
1 TABLET ORAL EVERY 4 HOURS PRN
Status: DISCONTINUED | OUTPATIENT
Start: 2023-04-14 | End: 2023-04-15 | Stop reason: HOSPADM

## 2023-04-14 RX ADMIN — PANTOPRAZOLE SODIUM 40 MG: 40 TABLET, DELAYED RELEASE ORAL at 06:04

## 2023-04-14 RX ADMIN — ONDANSETRON 4 MG: 2 INJECTION INTRAMUSCULAR; INTRAVENOUS at 21:25

## 2023-04-14 RX ADMIN — BISACODYL 5 MG: 5 TABLET, COATED ORAL at 18:46

## 2023-04-14 RX ADMIN — PROPRANOLOL HYDROCHLORIDE 60 MG: 40 TABLET ORAL at 08:29

## 2023-04-14 RX ADMIN — SENNOSIDES AND DOCUSATE SODIUM 2 TABLET: 8.6; 5 TABLET ORAL at 08:29

## 2023-04-14 RX ADMIN — SENNOSIDES AND DOCUSATE SODIUM 2 TABLET: 8.6; 5 TABLET ORAL at 21:21

## 2023-04-14 RX ADMIN — HYDROCODONE BITARTRATE AND ACETAMINOPHEN 1 TABLET: 7.5; 325 TABLET ORAL at 18:11

## 2023-04-14 RX ADMIN — TRAMADOL HYDROCHLORIDE 50 MG: 50 TABLET, COATED ORAL at 21:22

## 2023-04-14 RX ADMIN — ONDANSETRON 4 MG: 2 INJECTION INTRAMUSCULAR; INTRAVENOUS at 06:03

## 2023-04-14 RX ADMIN — PROPRANOLOL HYDROCHLORIDE 60 MG: 40 TABLET ORAL at 21:21

## 2023-04-14 RX ADMIN — HYDROCODONE BITARTRATE AND ACETAMINOPHEN 1 TABLET: 7.5; 325 TABLET ORAL at 08:29

## 2023-04-14 RX ADMIN — ENOXAPARIN SODIUM 40 MG: 100 INJECTION SUBCUTANEOUS at 08:29

## 2023-04-14 RX ADMIN — PROCHLORPERAZINE EDISYLATE 5 MG: 5 INJECTION, SOLUTION INTRAMUSCULAR; INTRAVENOUS at 11:03

## 2023-04-14 RX ADMIN — TERAZOSIN HYDROCHLORIDE 2 MG: 1 CAPSULE ORAL at 21:21

## 2023-04-14 RX ADMIN — POLYETHYLENE GLYCOL 3350 17 G: 17 POWDER, FOR SOLUTION ORAL at 18:46

## 2023-04-14 RX ADMIN — TRAMADOL HYDROCHLORIDE 50 MG: 50 TABLET, COATED ORAL at 11:04

## 2023-04-14 RX ADMIN — Medication 10 ML: at 21:22

## 2023-04-14 NOTE — PLAN OF CARE
Goal Outcome Evaluation:  Plan of Care Reviewed With: patient           Outcome Evaluation: Patient presents with deficits including balance, strength, bed mobility, transfers, and ambulation. She will benefit from physical therapy services while in the hospital. Upon discharge, it is recommended for the patient to go home with her sister with home health services.

## 2023-04-14 NOTE — THERAPY EVALUATION
Patient Name: Kimberly Landers  : 1955    MRN: [id number]                              Today's Date: 2023       Admit Date: 2023    Visit Dx:     ICD-10-CM ICD-9-CM   1. Closed fracture of left hip, initial encounter  S72.002A 820.8   2. Femoral neck fracture  S72.009A 820.8   3. Difficulty in walking  R26.2 719.7   4. Decreased activities of daily living (ADL)  Z78.9 V49.89     Patient Active Problem List   Diagnosis   • Generalized abdominal pain   • Altered bowel habits   • Diarrhea   • Nocturnal diarrhea   • History of Clostridioides difficile infection   • Nausea and vomiting   • Nausea   • Closed left hip fracture   • Femoral neck fracture     Past Medical History:   Diagnosis Date   • Chronic kidney disease    • GERD (gastroesophageal reflux disease)    • Hypertension      Past Surgical History:   Procedure Laterality Date   • COLONOSCOPY     • COLONOSCOPY N/A 2022    Procedure: COLONOSCOPY WITH BX;  Surgeon: Gordo Lange MD;  Location: [facility] ENDOSCOPY;  Service: Gastroenterology;  Laterality: N/A;  DIVERTICULOSIS   • DILATATION AND CURETTAGE      x3   • ENDOSCOPY N/A 2022    Procedure: ESOPHAGOGASTRODUODENOSCOPY WITH BX;  Surgeon: Gordo Lange MD;  Location: [facility] ENDOSCOPY;  Service: Gastroenterology;  Laterality: N/A;  NORMAL EGD   • HIP CANNULATED SCREW PLACEMENT Left 2023    Procedure: HIP CANNULATED SCREW PLACEMENT;  Surgeon: Sam Nunez MD;  Location: [facility] MAIN OR;  Service: Orthopedics;  Laterality: Left;      General Information     Row Name 23 1358          OT Time and Intention    Document Type evaluation  -ES     Mode of Treatment individual therapy;occupational therapy  -ES     Row Name 23 1358          General Information    Patient Profile Reviewed yes  -ES     Prior Level of Function independent:;ADL's;all household mobility;community mobility  Patient independent with B and IADLs at baseline. No device for functional  mobility. Tub/shower combo: standing shower completion. Standard commodes. Groom in stance. No home o2 use. Denies recent falls.  -ES     Existing Precautions/Restrictions fall;weight bearing  -ES     Barriers to Rehab none identified  -ES     Row Name 04/14/23 1350          Occupational Profile    Reason for Services/Referral (Occupational Profile) Patient is 67 yr old female admitted to [facility] on 4/12/2023 after mechanical fall at home resulting in left femoral neck fracture. Patient is POD 1, 25lb weight bearing restriction LLE. OT evaluation and treatment ordered d/t recent decline in ADLs/transfer ability and discharge planning recommendations. No previous OT services for current condition.  -ES     Row Name 04/14/23 5638          Living Environment    People in Home alone;other (see comments)  reports will be staying with family at discharge  -ES     Row Name 04/14/23 1357          Home Main Entrance    Number of Stairs, Main Entrance one  -ES     Row Name 04/14/23 1358          Stairs Within Home, Primary    Number of Stairs, Within Home, Primary none  -ES     Row Name 04/14/23 1354          Cognition    Orientation Status (Cognition) oriented x 4  patient pleasant and cooperative, agreeable to therapy evaluation  -ES     Row Name 04/14/23 135          Safety Issues, Functional Mobility    Impairments Affecting Function (Mobility) balance;endurance/activity tolerance;pain;strength  -ES           User Key  (r) = Recorded By, (t) = Taken By, (c) = Cosigned By    Initials Name Provider Type    Lisette Huynh, OTR/L, CSRS Occupational Therapist                 Mobility/ADL's     Row Name 04/14/23 1406          Bed Mobility    Bed Mobility supine-sit  -ES     Supine-Sit [place] (Bed Mobility) minimum assist (75% patient effort);1 person assist  -ES     Row Name 04/14/23 1406          Transfers    Transfers sit-stand transfer  -ES     Row Name 04/14/23 1406          Sit-Stand Transfer     Sit-Stand [place] (Transfers) minimum assist (75% patient effort);1 person assist  -ES     Assistive Device (Sit-Stand Transfers) walker, front-wheeled  -ES     Comment, (Sit-Stand Transfer) patient provided education and training on weight bearing status prior to mobility and transfers  -ES     Row Name 04/14/23 1406          Functional Mobility    Functional Mobility- Ind. Level 1 person;contact guard assist  -ES     Functional Mobility- Device walker, front-wheeled  -ES     Functional Mobility- Comment patient is able to maintain weight bearing with functional mobility with use of rolling walker  -ES     Row Name 04/14/23 1406          Activities of Daily Living    BADL Assessment/Intervention bathing;upper body dressing;lower body dressing;grooming;feeding;toileting  -ES     Row Name 04/14/23 1406          Mobility    Extremity Weight-bearing Status left lower extremity  -ES     Left Lower Extremity (Weight-bearing Status) partial weight-bearing (PWB)  25#  -ES     Row Name 04/14/23 1406          Bathing Assessment/Intervention    [place] Level (Bathing) bathing skills;minimum assist (75% patient effort)  -ES     Row Name 04/14/23 1406          Upper Body Dressing Assessment/Training    [place] Level (Upper Body Dressing) upper body dressing skills;set up;standby assist  -ES     Row Name 04/14/23 1406          Lower Body Dressing Assessment/Training    [place] Level (Lower Body Dressing) lower body dressing skills;minimum assist (75% patient effort)  -ES     Row Name 04/14/23 1406          Grooming Assessment/Training    [place] Level (Grooming) grooming skills;set up;standby assist  -ES     Row Name 04/14/23 1406          Self-Feeding Assessment/Training    [place] Level (Feeding) feeding skills;set up  -ES     Row Name 04/14/23 1406          Toileting Assessment/Training    [place] Level (Toileting) toileting skills;minimum assist (75% patient effort)  -ES           User Key   (r) = Recorded By, (t) = Taken By, (c) = Cosigned By    Initials Name Provider Type    Lisette Huynh OTR/L, MALIHA Occupational Therapist               Obj/Interventions     Row Name 04/14/23 1411          Sensory Assessment (Somatosensory)    Sensory Assessment (Somatosensory) sensation intact  -ES     Row Name 04/14/23 1411          Vision Assessment/Intervention    Visual Impairment/Limitations WFL  -ES     Row Name 04/14/23 1411          Range of Motion Comprehensive    General Range of Motion no range of motion deficits identified;bilateral upper extremity ROM WNL  -ES     Comment, General Range of Motion bilateral upper extremities WNL  -ES     Row Name 04/14/23 1411          Strength Comprehensive (MMT)    General Manual Muscle Testing (MMT) Assessment lower extremity strength deficits identified  -ES     Row Name 04/14/23 1411          Motor Skills    Motor Skills coordination;functional endurance  -ES     Coordination WFL  -ES     Functional Endurance fair  -ES     Row Name 04/14/23 1411          Balance    Balance Assessment sitting dynamic balance;standing dynamic balance  -ES     Dynamic Sitting Balance standby assist  -ES     Position, Sitting Balance unsupported;sitting edge of bed;sitting in chair  -ES     Dynamic Standing Balance contact guard;1-person assist  -ES     Position/Device Used, Standing Balance supported;walker, front-wheeled  -ES           User Key  (r) = Recorded By, (t) = Taken By, (c) = Cosigned By    Initials Name Provider Type    Lisette Huynh OTR/L, MALIHA Occupational Therapist               Goals/Plan     Row Name 04/14/23 1412          Transfer Goal 1 (OT)    Activity/Assistive Device (Transfer Goal 1, OT) transfers, all;walker, rolling  -ES     [place] Level/Cues Needed (Transfer Goal 1, OT) modified independence  -ES     Time Frame (Transfer Goal 1, OT) long term goal (LTG);10 days  -ES     Row Name 04/14/23 1412          Bathing Goal 1 (OT)    Activity/Device  (Bathing Goal 1, OT) bathing skills, all  -ES     [place] Level/Cues Needed (Bathing Goal 1, OT) modified independence  -ES     Time Frame (Bathing Goal 1, OT) long term goal (LTG);10 days  -ES     Row Name 04/14/23 1412          Dressing Goal 1 (OT)    Activity/Device (Dressing Goal 1, OT) dressing skills, all  -ES     [place]/Cues Needed (Dressing Goal 1, OT) modified independence  -ES     Time Frame (Dressing Goal 1, OT) long term goal (LTG);10 days  -ES     Row Name 04/14/23 1412          Toileting Goal 1 (OT)    Activity/Device (Toileting Goal 1, OT) toileting skills, all  -ES     [place] Level/Cues Needed (Toileting Goal 1, OT) modified independence  -ES     Time Frame (Toileting Goal 1, OT) long term goal (LTG);10 days  -ES     Row Name 04/14/23 1412          Grooming Goal 1 (OT)    Activity/Device (Grooming Goal 1, OT) grooming skills, all  -ES     [place] (Grooming Goal 1, OT) modified independence  -ES     Time Frame (Grooming Goal 1, OT) long term goal (LTG);10 days  -ES     Row Name 04/14/23 1412          Problem Specific Goal 1 (OT)    Problem Specific Goal 1 (OT) Patient will demonstrate fair plus dynamic balance in preperation for independent ADL routine completion at time of discharge  -ES     Time Frame (Problem Specific Goal 1, OT) long term goal (LTG);10 days  -ES     Row Name 04/14/23 1412          Therapy Assessment/Plan (OT)    Planned Therapy Interventions (OT) activity tolerance training;BADL retraining;functional balance retraining;IADL retraining;occupation/activity based interventions;patient/caregiver education/training;transfer/mobility retraining  -ES           User Key  (r) = Recorded By, (t) = Taken By, (c) = Cosigned By    Initials Name Provider Type    Lisette Huynh, OTR/L, CSRS Occupational Therapist               Clinical Impression     Row Name 04/14/23 1412          Plan of Care Review    Plan of Care Reviewed With patient  -ES     Progress no change  -ES      Outcome Evaluation Patient has experienced decline in function from baseline status, presenting w/ deficits related to balance, strength, transfers and mobility that impede patient independence with activities of daily living.  Patient would benefit from skilled Occupational Therapy intervention to maxamize patient safety, and promote return to baseline independence.  -ES     Row Name 04/14/23 1412          Therapy Assessment/Plan (OT)    Rehab Potential (OT) good, to achieve stated therapy goals  -ES     Criteria for Skilled Therapeutic Interventions Met (OT) yes;meets criteria;skilled treatment is necessary  -ES     Therapy Frequency (OT) 5 times/wk  -ES     Row Name 04/14/23 1412          Therapy Plan Review/Discharge Plan (OT)    Equipment Needs Upon Discharge (OT) walker, rolling  -ES     Anticipated Discharge Disposition (OT) home with home health;home with outpatient therapy services  -ES     Row Name 04/14/23 1412          Vital Signs    O2 Delivery Pre Treatment room air  -ES     O2 Delivery Intra Treatment room air  -ES     O2 Delivery Post Treatment room air  -ES     Row Name 04/14/23 1412          Positioning and Restraints    Pre-Treatment Position in bed  -ES     Post Treatment Position chair  -ES           User Key  (r) = Recorded By, (t) = Taken By, (c) = Cosigned By    Initials Name Provider Type    ES Lisette Rojas, OTR/L, CSRS Occupational Therapist               Outcome Measures     Row Name 04/14/23 1414          How much help from another is currently needed...    Putting on and taking off regular lower body clothing? 3  -ES     Bathing (including washing, rinsing, and drying) 3  -ES     Toileting (which includes using toilet bed pan or urinal) 3  -ES     Putting on and taking off regular upper body clothing 4  -ES     Taking care of personal grooming (such as brushing teeth) 4  -ES     Eating meals 4  -ES     AM-PAC 6 Clicks Score (OT) 21  -ES     Row Name 04/14/23 1100 04/14/23 0720        How much help from another person do you currently need...    Turning from your back to your side while in flat bed without using bedrails? 3  -ANTONI 3  -MS    Moving from lying on back to sitting on the side of a flat bed without bedrails? 3  -ANTONI 3  -MS    Moving to and from a bed to a chair (including a wheelchair)? 3  -ANTONI 3  -MS    Standing up from a chair using your arms (e.g., wheelchair, bedside chair)? 3  -ANTONI 3  -MS    Climbing 3-5 steps with a railing? 2  -ANTONI 2  -MS    To walk in hospital room? 3  -ANTONI 2  -MS    AM-PAC 6 Clicks Score (PT) 17  -ANTONI 16  -MS    Highest level of mobility 5 --> Static standing  -ANTONI 5 --> Static standing  -MS    Row Name 04/14/23 1414 04/14/23 1100       Functional Assessment    Outcome Measure Options AM-PAC 6 Clicks Daily Activity (OT);Optimal Instrument  -ES AM-PAC 6 Clicks Basic Mobility (PT)  -ANTONI    Row Name 04/14/23 1414          Optimal Instrument    Optimal Instrument Optimal - 3  -ES     Bending/Stooping 3  -ES     Standing 2  -ES     Reaching 1  -ES     From the list, choose the 3 activities you would most like to be able to do without any difficulty Bending/stooping;Standing;Reaching  -ES     Total Score Optimal - 3 6  -ES           User Key  (r) = Recorded By, (t) = Taken By, (c) = Cosigned By    Initials Name Provider Type    Negrita Matson, RN Registered Nurse    Sergey Vidal, PT Physical Therapist    Lisette Huynh, OTR/L, CSRS Occupational Therapist                  OT Recommendation and Plan  Planned Therapy Interventions (OT): activity tolerance training, BADL retraining, functional balance retraining, IADL retraining, occupation/activity based interventions, patient/caregiver education/training, transfer/mobility retraining  Therapy Frequency (OT): 5 times/wk  Plan of Care Review  Plan of Care Reviewed With: patient  Progress: no change  Outcome Evaluation: Patient has experienced decline in function from baseline status, presenting w/ deficits related  to balance, strength, transfers and mobility that impede patient independence with activities of daily living.  Patient would benefit from skilled Occupational Therapy intervention to maxamize patient safety, and promote return to baseline independence.     Time Calculation:    Time Calculation- OT     Row Name 04/14/23 1418             Time Calculation- OT    OT Received On 04/14/23  -ES      OT Goal Re-Cert Due Date 04/23/23  -ES         Untimed Charges    OT Eval/Re-eval Minutes 34  -ES         Total Minutes    Untimed Charges Total Minutes 34  -ES       Total Minutes 34  -ES            User Key  (r) = Recorded By, (t) = Taken By, (c) = Cosigned By    Initials Name Provider Type    ES Lisette Rojas, OTR/L, CSRS Occupational Therapist              Therapy Charges for Today     Code Description Service Date Service Provider Modifiers Qty    [id number] HC OT EVAL LOW COMPLEXITY 3 4/14/2023 Lisette Rojas, OTR/L, CSRS GO 1               Lisette Rojas OTR/L, CSRS  4/14/2023

## 2023-04-14 NOTE — THERAPY EVALUATION
Acute Care - Physical Therapy Initial Evaluation   So     Patient Name: Kimberly Landers  : 1955  MRN: [id number]  Today's Date: 2023      Visit Dx: Admit date: 2023     Referring Physician: Wally Patterson MD     Surgery Date:2023 - 2023   Procedure(s) (LRB):  HIP CANNULATED SCREW PLACEMENT (Left)         ICD-10-CM ICD-9-CM   1. Closed fracture of left hip, initial encounter  S72.002A 820.8   2. Femoral neck fracture  S72.009A 820.8   3. Difficulty in walking  R26.2 719.7     Patient Active Problem List   Diagnosis   • Generalized abdominal pain   • Altered bowel habits   • Diarrhea   • Nocturnal diarrhea   • History of Clostridioides difficile infection   • Nausea and vomiting   • Nausea   • Closed left hip fracture   • Femoral neck fracture     Past Medical History:   Diagnosis Date   • Chronic kidney disease    • GERD (gastroesophageal reflux disease)    • Hypertension      Past Surgical History:   Procedure Laterality Date   • COLONOSCOPY     • COLONOSCOPY N/A 2022    Procedure: COLONOSCOPY WITH BX;  Surgeon: Gordo Lange MD;  Location: [facility] ENDOSCOPY;  Service: Gastroenterology;  Laterality: N/A;  DIVERTICULOSIS   • DILATATION AND CURETTAGE      x3   • ENDOSCOPY N/A 2022    Procedure: ESOPHAGOGASTRODUODENOSCOPY WITH BX;  Surgeon: Gordo Lange MD;  Location: [facility] ENDOSCOPY;  Service: Gastroenterology;  Laterality: N/A;  NORMAL EGD   • HIP CANNULATED SCREW PLACEMENT Left 2023    Procedure: HIP CANNULATED SCREW PLACEMENT;  Surgeon: Sam Nunez MD;  Location: [facility] MAIN OR;  Service: Orthopedics;  Laterality: Left;     PT Assessment (last 12 hours)     PT Evaluation and Treatment     Row Name 23 1053          Physical Therapy Time and Intention    Subjective Information no complaints  -ANTONI     Document Type evaluation  -ANTONI     Mode of Treatment individual therapy;physical therapy  -ANTONI     Patient Effort good  -ANTONI     Symptoms  Noted During/After Treatment nausea  -ANTONI     Row Name 04/14/23 1053          General Information    Patient Profile Reviewed yes  -ANTONI     Patient Observations alert;cooperative;agree to therapy  -ANTONI     Prior Level of Function independent:;gait;transfer;bed mobility;ADL's  -ANTONI     Equipment Currently Used at Home none  -ANTONI     Existing Precautions/Restrictions fall  -ANTONI     Barriers to Rehab none identified  -ANTONI     Row Name 04/14/23 1053          Living Environment    Current Living Arrangements home  Plans to move in with her sister upon discharge.  -ANTONI     Home Accessibility wheelchair accessible;stairs to enter home  Patient's home has a ramp, but sister's home has a step to enter.  -ANTONI     People in Home alone;sibling(s)  Prior to fracture, patient lived alone. She is planning to live with her sister upon discharge.  -ANTONI     Primary Care Provided by self  -ANTONI     Row Name 04/14/23 1053          Home Main Entrance    Number of Stairs, Main Entrance one  -ANTONI     Row Name 04/14/23 1053          Range of Motion (ROM)    Range of Motion ROM is WFL;bilateral lower extremities  -ANTONI     Row Name 04/14/23 1053          Strength (Manual Muscle Testing)    Strength (Manual Muscle Testing) bilateral lower extremities  RLE: 4/5, LLE: 3/5 due to pain  -ANTONI     Row Name 04/14/23 1053          Mobility    Extremity Weight-bearing Status left lower extremity  -ANTONI     Left Lower Extremity (Weight-bearing Status) partial weight-bearing (PWB)  25#  -ANTONI     Row Name 04/14/23 1053          Bed Mobility    Bed Mobility sit-supine  -ANTONI     Sit-Supine [place] (Bed Mobility) minimum assist (75% patient effort);1 person assist  -ANTONI     Row Name 04/14/23 1053          Transfers    Transfers sit-stand transfer  -ANTONI     Maintains Weight-bearing Status (Transfers) able to maintain  -ANTONI     Row Name 04/14/23 1053          Sit-Stand Transfer    Sit-Stand [place] (Transfers) minimum assist (75% patient effort);1 person assist  -ANTONI      Assistive Device (Sit-Stand Transfers) walker, front-wheeled  -ANTONI     Row Name 04/14/23 1053          Gait/Stairs (Locomotion)    Gait/Stairs Locomotion gait/ambulation assistive device  -ANTONI     [place] Level (Gait) contact guard  -ANTONI     Assistive Device (Gait) walker, front-wheeled  -ANTONI     Distance in Feet (Gait) 10  -ANTONI     Deviations/Abnormal Patterns (Gait) bilateral deviations;antalgic;festinating/shuffling;gait speed decreased  -ANTONI     Bilateral Gait Deviations forward flexed posture  -ANTONI     Row Name 04/14/23 1053          Safety Issues, Functional Mobility    Impairments Affecting Function (Mobility) balance;endurance/activity tolerance;pain;strength  -ANTONI     Row Name 04/14/23 1053          Balance    Balance Assessment standing dynamic balance  -ANTONI     Dynamic Standing Balance contact guard  -ANTONI     Position/Device Used, Standing Balance supported;walker, front-wheeled  -ANTONI     Row Name             Wound 04/13/23 1057 Left lateral hip Incision    Wound - Properties Group Placement Date: 04/13/23  -RL Placement Time: 1057  -RL Side: Left  -RL Orientation: lateral  -RL Location: hip  -RL Primary Wound Type: Incision  -RL    Retired Wound - Properties Group Placement Date: 04/13/23  -RL Placement Time: 1057  -RL Side: Left  -RL Orientation: lateral  -RL Location: hip  -RL Primary Wound Type: Incision  -RL    Retired Wound - Properties Group Date first assessed: 04/13/23  -RL Time first assessed: 1057  -RL Side: Left  -RL Location: hip  -RL Primary Wound Type: Incision  -RL    Row Name 04/14/23 1053          Plan of Care Review    Plan of Care Reviewed With patient  -ANTONI     Outcome Evaluation Patient presents with deficits including balance, strength, bed mobility, transfers, and ambulation. She will benefit from physical therapy services while in the hospital. Upon discharge, it is recommended for the patient to go home with her sister with home health services.  -ANTONI     Row Name 04/14/23 1053           Therapy Assessment/Plan (PT)    Rehab Potential (PT) good, to achieve stated therapy goals  -ANTONI     Criteria for Skilled Interventions Met (PT) skilled treatment is necessary  -ANTONI     Therapy Frequency (PT) daily  -ANTONI     Predicted Duration of Therapy Intervention (PT) 10 days  -ANTONI     Problem List (PT) problems related to;balance;mobility;strength;pain  -ANTONI     Activity Limitations Related to Problem List (PT) unable to ambulate safely  -ANTONI     Row Name 04/14/23 1053          PT Evaluation Complexity    History, PT Evaluation Complexity 1-2 personal factors and/or comorbidities  -ANTONI     Examination of Body Systems (PT Eval Complexity) total of 4 or more elements  -ANTONI     Clinical Presentation (PT Evaluation Complexity) stable  -ANTONI     Clinical Decision Making (PT Evaluation Complexity) low complexity  -ANTONI     Overall Complexity (PT Evaluation Complexity) low complexity  -ANTONI     Row Name 04/14/23 1053          Therapy Plan Review/Discharge Plan (PT)    Therapy Plan Review (PT) evaluation/treatment results reviewed;patient  -ANTONI     Row Name 04/14/23 1053          Physical Therapy Goals    Bed Mobility Goal Selection (PT) bed mobility, PT goal 1  -ANTONI     Transfer Goal Selection (PT) transfer, PT goal 1  -ANTONI     Gait Training Goal Selection (PT) gait training, PT goal 1  -ANTONI     Row Name 04/14/23 1053          Bed Mobility Goal 1 (PT)    Activity/Assistive Device (Bed Mobility Goal 1, PT) bed mobility activities, all  -ANTONI     [place] Level/Cues Needed (Bed Mobility Goal 1, PT) independent  -ANTONI     Time Frame (Bed Mobility Goal 1, PT) 10 days  -ANTONI     Row Name 04/14/23 1053          Transfer Goal 1 (PT)    Activity/Assistive Device (Transfer Goal 1, PT) sit-to-stand/stand-to-sit;bed-to-chair/chair-to-bed;walker, rolling  -ANTONI     [place] Level/Cues Needed (Transfer Goal 1, PT) standby assist  -ANTONI     Time Frame (Transfer Goal 1, PT) 10 days  -ANTONI     Row Name 04/14/23 1053          Gait Training Goal 1 (PT)     Activity/Assistive Device (Gait Training Goal 1, PT) gait (walking locomotion);assistive device use;improve balance and speed;increase endurance/gait distance;maintain weight-bearing status;walker, rolling  -ANTONI     [place] Level (Gait Training Goal 1, PT) standby assist  -ANTONI     Distance (Gait Training Goal 1, PT) 50 ft  -ANTONI     Time Frame (Gait Training Goal 1, PT) 10 days  -ANTONI           User Key  (r) = Recorded By, (t) = Taken By, (c) = Cosigned By    Initials Name Provider Type    Sergey Vidal, PT Physical Therapist    Betzaida Pineda RN Registered Nurse                  PT Recommendation and Plan  Anticipated Discharge Disposition (PT): home with home health  Planned Therapy Interventions (PT): balance training, bed mobility training, gait training, motor coordination training, neuromuscular re-education, stair training, strengthening, transfer training  Therapy Frequency (PT): daily  Plan of Care Reviewed With: patient  Outcome Evaluation: Patient presents with deficits including balance, strength, bed mobility, transfers, and ambulation. She will benefit from physical therapy services while in the hospital. Upon discharge, it is recommended for the patient to go home with her sister with home health services.   Outcome Measures     Row Name 04/14/23 1100             How much help from another person do you currently need...    Turning from your back to your side while in flat bed without using bedrails? 3  -ANTONI      Moving from lying on back to sitting on the side of a flat bed without bedrails? 3  -ANTONI      Moving to and from a bed to a chair (including a wheelchair)? 3  -ANTONI      Standing up from a chair using your arms (e.g., wheelchair, bedside chair)? 3  -ANTONI      Climbing 3-5 steps with a railing? 2  -ANTONI      To walk in hospital room? 3  -ANTONI      AM-PAC 6 Clicks Score (PT) 17  -ANTONI         Functional Assessment    Outcome Measure Options AM-PAC 6 Clicks Basic Mobility (PT)  -ANTONI             User Key  (r) = Recorded By, (t) = Taken By, (c) = Cosigned By    Initials Name Provider Type    Sergey Vidal, PT Physical Therapist                 Time Calculation:    PT Charges     Row Name 04/14/23 1107             Time Calculation    PT Received On 04/14/23  -ANTONI      PT Goal Re-Cert Due Date 04/23/23  -ANTONI         Untimed Charges    PT Eval/Re-eval Minutes 40  -ANTONI         Total Minutes    Untimed Charges Total Minutes 40  -ANTONI       Total Minutes 40  -ANTONI            User Key  (r) = Recorded By, (t) = Taken By, (c) = Cosigned By    Initials Name Provider Type    Sergey Vidal, PT Physical Therapist              Therapy Charges for Today     Code Description Service Date Service Provider Modifiers Qty    70929649267 HC PT EVAL LOW COMPLEXITY 3 4/14/2023 Sergey Pizarro PT GP 1          PT G-Codes  Outcome Measure Options: AM-PAC 6 Clicks Basic Mobility (PT)  AM-PAC 6 Clicks Score (PT): 17    Sergey Pizarro PT  4/14/2023

## 2023-04-14 NOTE — OP NOTE
HIP CANNULATED SCREW PLACEMENT  Procedure Report    Patient Name:  Kimberly Landers  YOB: 1955    Date of Surgery:  4/13/2023     Indications: See H&P    Pre-op Diagnosis:   Femoral neck fracture [S72.009A]   [facility]         Post-Op Diagnosis Codes:     * Femoral neck fracture [S72.009A]     [place] 1  Procedure/CPT® Codes:      Procedure(s):  Left HIP CANNULATED SCREW PLACEMENT  For a [place] 1 femoral neck fracture  Staff:  Surgeon(s):  Sam Nunez MD         Anesthesia: Choice    Estimated Blood Loss: minimal    Implants:    Implant Name Type Inv. Item Serial No.  Lot No. LRB No. Used Action   SCRW ADIA SD/ST [id number] 6.5X75MM - [id number] Implant SCRW ADIA SD/ST [id number] 6.5X75MM  [employer]  Right 1 Implanted   SCRW ADIA SD/ST [id number] 6.5X80MM - [id number] Implant SCRW ADIA SD/ST [id number] 6.5X80MM  [employer]  Right 1 Implanted   SCRW ADIA SD/ST [id number] 6.5X85MM - [id number] Implant SCRW ADIA SD/ST [id number] 6.5X85MM  [employer]  Right 1 Implanted       Specimen:          None        Findings: See description    Complications: None    Description of Procedure: Patient was given antibiotics per SCIP protocol. Patient was placed on a Tyler table. C-arm was used through the entire procedure to assess the adequacy of the fracture reduction and position of the implants. The hip was prepped and draped in a standard fashion. A shower curtain drape was applied. A lateral approach was carried out to the base of the greater trochanter. The fascia james was incised. A guide pin was advanced in the center/center position of the femoral neck. This was advanced up to the subchondral bone of the femoral head. Great care was taken to prevent intraarticular penetration of the implants. Two additional guide pins were placed parallel to the  pin, one superiorly and one inferiorly and somewhat posteriorly. We engaged the proximal capital fragment in a satisfactory fashion. Length of the  cannulated screws were carefully measured. The lateral cortex was drilled out with a cannulated reamer. This was followed by the placement of 3 screws from lateral to medial across the fracture site. The screws had good purchase in the bone. Great care was taken to prevent intraarticular penetration of the screws. Wounds were lavaged with Bacitracin irrigating solution. The entire construct was extremely stable. The fascia was repaired with interrupted sutures.  The deep fat was closed with 0 Vicryl subcu was closed with 2-0 Vicryl and the skin was closed with staples.  Incision was washed and dried and sterile dressings were applied and the patient was tolerated procedure well taken recovery room    Date: 4/14/2023  Time: 17:15 EDT

## 2023-04-14 NOTE — PLAN OF CARE
Goal Outcome Evaluation:  Plan of Care Reviewed With: patient        Progress: no change  Outcome Evaluation: Patient has experienced decline in function from baseline status, presenting w/ deficits related to balance, strength, transfers and mobility that impede patient independence with activities of daily living.  Patient would benefit from skilled Occupational Therapy intervention to maxamize patient safety, and promote return to baseline independence.    Recommend: home with home health as patient will be staying with sister at discharge

## 2023-04-14 NOTE — THERAPY TREATMENT NOTE
Acute Care - Physical Therapy Treatment Note   So     Patient Name: Kimberly Landers  : 1955  MRN: [id number]  Today's Date: 2023      Visit Dx: Admit date: 2023     Referring Physician: Wally Patterson MD     Surgery Date:2023 - 2023   Procedure(s) (LRB):  HIP CANNULATED SCREW PLACEMENT (Left)         ICD-10-CM ICD-9-CM   1. Closed fracture of left hip, initial encounter  S72.002A 820.8   2. Femoral neck fracture  S72.009A 820.8   3. Difficulty in walking  R26.2 719.7   4. Decreased activities of daily living (ADL)  Z78.9 V49.89     Patient Active Problem List   Diagnosis   • Generalized abdominal pain   • Altered bowel habits   • Diarrhea   • Nocturnal diarrhea   • History of Clostridioides difficile infection   • Nausea and vomiting   • Nausea   • Closed left hip fracture   • Femoral neck fracture     Past Medical History:   Diagnosis Date   • Chronic kidney disease    • GERD (gastroesophageal reflux disease)    • Hypertension      Past Surgical History:   Procedure Laterality Date   • COLONOSCOPY     • COLONOSCOPY N/A 2022    Procedure: COLONOSCOPY WITH BX;  Surgeon: Gordo Lange MD;  Location: [facility] ENDOSCOPY;  Service: Gastroenterology;  Laterality: N/A;  DIVERTICULOSIS   • DILATATION AND CURETTAGE      x3   • ENDOSCOPY N/A 2022    Procedure: ESOPHAGOGASTRODUODENOSCOPY WITH BX;  Surgeon: Gordo Lange MD;  Location: [facility] ENDOSCOPY;  Service: Gastroenterology;  Laterality: N/A;  NORMAL EGD   • HIP CANNULATED SCREW PLACEMENT Left 2023    Procedure: HIP CANNULATED SCREW PLACEMENT;  Surgeon: Sam Nunez MD;  Location: [facility] MAIN OR;  Service: Orthopedics;  Laterality: Left;     PT Assessment (last 12 hours)     PT Evaluation and Treatment     Row Name 23 1509 23 1053       Physical Therapy Time and Intention    Subjective Information no complaints  -ANTONI no complaints  -ANTONI    Document Type therapy note (daily note)  -ANTONI  evaluation  -ANTONI    Mode of Treatment individual therapy;physical therapy  -ANTONI individual therapy;physical therapy  -ANTONI    Patient Effort good  -ANTONI good  -ANTONI    Symptoms Noted During/After Treatment fatigue;increased pain  -ANTONI nausea  -ANTONI    Row Name 04/14/23 1509 04/14/23 1053       General Information    Patient Profile Reviewed yes  -ANTONI yes  -ANTONI    Patient Observations -- alert;cooperative;agree to therapy  -ANTONI    Prior Level of Function -- independent:;gait;transfer;bed mobility;ADL's  -ANTONI    Equipment Currently Used at Home -- none  -ANTONI    Existing Precautions/Restrictions -- fall  -ANTONI    Barriers to Rehab -- none identified  -ANTONI    Row Name 04/14/23 1053          Living Environment    Current Living Arrangements home  Plans to move in with her sister upon discharge.  -ANTONI     Home Accessibility wheelchair accessible;stairs to enter home  Patient's home has a ramp, but sister's home has a step to enter.  -ANTONI     People in Home alone;sibling(s)  Prior to fracture, patient lived alone. She is planning to live with her sister upon discharge.  -ANTONI     Primary Care Provided by self  -ANTONI     Row Name 04/14/23 1053          Home Main Entrance    Number of Stairs, Main Entrance one  -ANTONI     Row Name 04/14/23 1053          Range of Motion (ROM)    Range of Motion ROM is WFL;bilateral lower extremities  -ANTONI     Row Name 04/14/23 1053          Strength (Manual Muscle Testing)    Strength (Manual Muscle Testing) bilateral lower extremities  RLE: 4/5, LLE: 3/5 due to pain  -ANTONI     Row Name 04/14/23 1509 04/14/23 1053       Mobility    Extremity Weight-bearing Status left lower extremity  -ANTONI left lower extremity  -ANTONI    Left Lower Extremity (Weight-bearing Status) partial weight-bearing (PWB)  25#  -ANTONI partial weight-bearing (PWB)  25#  -ANTONI    Row Name 04/14/23 1509 04/14/23 1053       Bed Mobility    Bed Mobility supine-sit  -ANTONI sit-supine  -ANTONI    Supine-Sit [place] (Bed Mobility) not tested  Patient was sitting upright  in the recliner upon entering the room.  -ANTONI --    Sit-Supine [place] (Bed Mobility) -- minimum assist (75% patient effort);1 person assist  -ANTONI    Row Name 04/14/23 1509 04/14/23 1053       Transfers    Transfers sit-stand transfer  -ANTONI sit-stand transfer  -ANTONI    Maintains Weight-bearing Status (Transfers) able to maintain  -ANTONI able to maintain  -ANTONI    Row Name 04/14/23 1509 04/14/23 1053       Sit-Stand Transfer    Sit-Stand [place] (Transfers) minimum assist (75% patient effort);1 person assist  -ANTONI minimum assist (75% patient effort);1 person assist  -ANTONI    Assistive Device (Sit-Stand Transfers) walker, front-wheeled  -ANTONI walker, front-wheeled  -ANTONI    Row Name 04/14/23 1509 04/14/23 1053       Gait/Stairs (Locomotion)    Gait/Stairs Locomotion gait/ambulation assistive device  -ANTONI gait/ambulation assistive device  -ANTONI    [place] Level (Gait) contact guard  -ANTONI contact guard  -ANTONI    Assistive Device (Gait) walker, front-wheeled  -ANTONI walker, front-wheeled  -ANTONI    Distance in Feet (Gait) 60  -ANTONI 10  -ANTONI    Deviations/Abnormal Patterns (Gait) bilateral deviations;antalgic;festinating/shuffling;gait speed decreased  -ANTONI bilateral deviations;antalgic;festinating/shuffling;gait speed decreased  -ANTONI    Bilateral Gait Deviations forward flexed posture  -ANTONI forward flexed posture  -ANTONI    Row Name 04/14/23 1509 04/14/23 1053       Safety Issues, Functional Mobility    Impairments Affecting Function (Mobility) balance;endurance/activity tolerance;pain;strength  -ANTONI balance;endurance/activity tolerance;pain;strength  -ANTONI    Row Name 04/14/23 1509 04/14/23 1053       Balance    Balance Assessment standing dynamic balance  -ANTONI standing dynamic balance  -ANTONI    Dynamic Standing Balance contact guard  -ANTONI contact guard  -ANTONI    Position/Device Used, Standing Balance supported;walker, front-wheeled  -ANTONI supported;walker, front-wheeled  -ANTONI    Row Name             Wound 04/13/23 1057 Left lateral hip Incision    Wound  - Properties Group Placement Date: 04/13/23  -RL Placement Time: 1057  -RL Side: Left  -RL Orientation: lateral  -RL Location: hip  -RL Primary Wound Type: Incision  -RL    Retired Wound - Properties Group Placement Date: 04/13/23  -RL Placement Time: 1057  -RL Side: Left  -RL Orientation: lateral  -RL Location: hip  -RL Primary Wound Type: Incision  -RL    Retired Wound - Properties Group Date first assessed: 04/13/23  -RL Time first assessed: 1057  -RL Side: Left  -RL Location: hip  -RL Primary Wound Type: Incision  -RL    Row Name 04/14/23 1053          Plan of Care Review    Plan of Care Reviewed With patient  -ANTONI     Outcome Evaluation Patient presents with deficits including balance, strength, bed mobility, transfers, and ambulation. She will benefit from physical therapy services while in the hospital. Upon discharge, it is recommended for the patient to go home with her sister with home health services.  -ANTONI     Row Name 04/14/23 7825          Therapy Assessment/Plan (PT)    Rehab Potential (PT) good, to achieve stated therapy goals  -ANTONI     Criteria for Skilled Interventions Met (PT) skilled treatment is necessary  -ANTONI     Therapy Frequency (PT) daily  -ANTONI     Predicted Duration of Therapy Intervention (PT) 10 days  -ANTONI     Problem List (PT) problems related to;balance;mobility;strength;pain  -ANTONI     Activity Limitations Related to Problem List (PT) unable to ambulate safely  -ANTONI     Row Name 04/14/23 2937          PT Evaluation Complexity    History, PT Evaluation Complexity 1-2 personal factors and/or comorbidities  -ANTONI     Examination of Body Systems (PT Eval Complexity) total of 4 or more elements  -ANTONI     Clinical Presentation (PT Evaluation Complexity) stable  -ANTONI     Clinical Decision Making (PT Evaluation Complexity) low complexity  -ANTONI     Overall Complexity (PT Evaluation Complexity) low complexity  -ANTONI     Row Name 04/14/23 8128          Progress Summary (PT)    Progress Toward Functional Goals  (PT) progress toward functional goals is good  -ANTONI     Daily Progress Summary (PT) Patient tolerated ambulating in her room and the hallway today. She will continue to benefit from physical therapy services while in the hospital.  -ANTONI     Row Name 04/14/23 1053          Therapy Plan Review/Discharge Plan (PT)    Therapy Plan Review (PT) evaluation/treatment results reviewed;patient  -ANTONI     Row Name 04/14/23 1053          Physical Therapy Goals    Bed Mobility Goal Selection (PT) bed mobility, PT goal 1  -ANTONI     Transfer Goal Selection (PT) transfer, PT goal 1  -ANTONI     Gait Training Goal Selection (PT) gait training, PT goal 1  -ANTONI     Row Name 04/14/23 1053          Bed Mobility Goal 1 (PT)    Activity/Assistive Device (Bed Mobility Goal 1, PT) bed mobility activities, all  -ANTONI     [place] Level/Cues Needed (Bed Mobility Goal 1, PT) independent  -ANTONI     Time Frame (Bed Mobility Goal 1, PT) 10 days  -ANTONI     Row Name 04/14/23 1053          Transfer Goal 1 (PT)    Activity/Assistive Device (Transfer Goal 1, PT) sit-to-stand/stand-to-sit;bed-to-chair/chair-to-bed;walker, rolling  -ANTONI     [place] Level/Cues Needed (Transfer Goal 1, PT) standby assist  -ANTONI     Time Frame (Transfer Goal 1, PT) 10 days  -ANTONI     Row Name 04/14/23 1053          Gait Training Goal 1 (PT)    Activity/Assistive Device (Gait Training Goal 1, PT) gait (walking locomotion);assistive device use;improve balance and speed;increase endurance/gait distance;maintain weight-bearing status;walker, rolling  -ANTONI     [place] Level (Gait Training Goal 1, PT) standby assist  -ANTONI     Distance (Gait Training Goal 1, PT) 50 ft  -ANTONI     Time Frame (Gait Training Goal 1, PT) 10 days  -ANTONI           User Key  (r) = Recorded By, (t) = Taken By, (c) = Cosigned By    Initials Name Provider Type    Sergey Vidal, PT Physical Therapist    Betzaida Pineda, RN Registered Nurse                  PT Recommendation and Plan  Anticipated Discharge  Disposition (PT): home with home health  Planned Therapy Interventions (PT): balance training, bed mobility training, gait training, motor coordination training, neuromuscular re-education, stair training, strengthening, transfer training  Therapy Frequency (PT): daily  Progress Summary (PT)  Progress Toward Functional Goals (PT): progress toward functional goals is good  Daily Progress Summary (PT): Patient tolerated ambulating in her room and the hallway today. She will continue to benefit from physical therapy services while in the hospital.  Plan of Care Reviewed With: patient  Outcome Evaluation: Patient presents with deficits including balance, strength, bed mobility, transfers, and ambulation. She will benefit from physical therapy services while in the hospital. Upon discharge, it is recommended for the patient to go home with her sister with home health services.   Outcome Measures     Row Name 04/14/23 1500 04/14/23 1100          How much help from another person do you currently need...    Turning from your back to your side while in flat bed without using bedrails? 3  -ANTONI 3  -ANTONI     Moving from lying on back to sitting on the side of a flat bed without bedrails? 3  -ANOTNI 3  -ANTONI     Moving to and from a bed to a chair (including a wheelchair)? 3  -ANTONI 3  -ANTONI     Standing up from a chair using your arms (e.g., wheelchair, bedside chair)? 3  -ANTONI 3  -ANTONI     Climbing 3-5 steps with a railing? 2  -ANTONI 2  -ANTONI     To walk in hospital room? 3  -ANTONI 3  -ANTONI     AM-PAC 6 Clicks Score (PT) 17  -ANTONI 17  -ANTONI        Functional Assessment    Outcome Measure Options AM-PAC 6 Clicks Basic Mobility (PT)  -ANTONI AM-PAC 6 Clicks Basic Mobility (PT)  -ANTONI           User Key  (r) = Recorded By, (t) = Taken By, (c) = Cosigned By    Initials Name Provider Type    Sergey Vidal, PT Physical Therapist                 Time Calculation:    PT Charges     Row Name 04/14/23 1513 04/14/23 1107          Time Calculation    PT Received On  04/14/23  -ANTONI 04/14/23  -ANTONI     PT Goal Re-Cert Due Date -- 04/23/23  -ATNONI        Timed Charges    66027 - Gait Training Minutes  10  -ANTONI --        Untimed Charges    PT Eval/Re-eval Minutes -- 40  -ANTONI        Total Minutes    Timed Charges Total Minutes 10  -ANTONI --     Untimed Charges Total Minutes -- 40  -ANTONI      Total Minutes 10  -ANTONI 40  -ANTONI           User Key  (r) = Recorded By, (t) = Taken By, (c) = Cosigned By    Initials Name Provider Type    Sergey Vidal, PT Physical Therapist              Therapy Charges for Today     Code Description Service Date Service Provider Modifiers Qty    21188797675 HC PT EVAL LOW COMPLEXITY 3 4/14/2023 Sergey Pizarro, PT GP 1    64666313042 HC GAIT TRAINING EA 15 MIN 4/14/2023 Sergey Pizarro, PT GP 1          PT G-Codes  Outcome Measure Options: AM-PAC 6 Clicks Basic Mobility (PT)  AM-PAC 6 Clicks Score (PT): 17  AM-PAC 6 Clicks Score (OT): 21    Sergey Pizarro PT  4/14/2023

## 2023-04-14 NOTE — PROGRESS NOTES
[facility]   Hospitalist Progress Note  Date: 2023  Patient Name: Kimberly Landers  : 1955  MRN: [id number]  Date of admission: 2023      Subjective   Subjective     Chief Complaint: Hip pain    Summary: 67 y.o. female PMH GERD, hypertension, migraine headaches who presented to [facility] after a fall at home.  She states she was walking through her living room, tripped on her rug and sustained a mechanical fall.  She noticed immediate 10/10 left hip pain.  She presented to [place] ED where she was found to have a left subcapital femoral neck fracture.  Case discussed with Dr. Nunez and orthopedic surgery at [facility] who has agreed to consult on the patient.  She was transferred to our facility.  Orthopedic surgery was consulted and took patient to the OR on  for ORIF.  She tolerated procedure well    Interval Followup: No events overnight.  Her pain is better controlled.  Still having some intermittent nausea but more tolerable today.  Worked with therapy and was able to ambulate.    Review of Systems   Denies fevers or chest pain     Objective   Objective     Vitals:   Temp:  [97.3 °F (36.3 °C)-98 °F (36.7 °C)] 97.7 °F (36.5 °C)  Heart Rate:  [60-83] 72  Resp:  [16-18] 16  BP: (106-143)/(44-88) 111/56  Flow (L/min):  [2] 2  Physical Exam    Constitutional: Awake, alert, NAD, pleasant   Respiratory: Clear to auscultation bilaterally, nonlabored respirations    Cardiovascular: RRR, no MRG   Gastrointestinal: Positive bowel sounds, soft, nontender, nondistended   Neurologic: Oriented x 3, strength symmetric in all extremities, Cranial Nerves grossly intact to confrontation, speech clear              Ext: Left hip with dressing that is c/d/i, expected postop ROM    Result Review    Result Review:  I have personally reviewed the results below:  [x]  Laboratory all labs reviewed  []  Microbiology  []  Radiology  [x]  EKG/Telemetry   []  Cardiology/Vascular   []   Pathology  []  Old records  []  Other:  CBC        4/12/2023    17:54 4/13/2023    04:12 4/14/2023    04:13   CBC   WBC 7.22   6.78   6.37     RBC 4.59   4.61   4.05     Hemoglobin 13.6   13.8   12.3     Hematocrit 41.4   42.1   37.5     MCV 90.2   91.3   92.6     MCH 29.6   29.9   30.4     MCHC 32.9   32.8   32.8     RDW 12.5   12.6   12.1     Platelets 147   149   133       CMP        4/12/2023    17:54 4/13/2023    04:12 4/14/2023    04:14   CMP   Glucose 105   113   137     BUN 18   21   17     Creatinine 0.92   0.98   0.64     EGFR 68.4   63.4   97.0     Sodium 137   139   138     Potassium 4.1   4.3   4.6     Chloride 100   101   103     Calcium 8.9   9.4   8.8     Total Protein 6.2       Albumin 3.5       Globulin 2.7       Total Bilirubin 0.8       Alkaline Phosphatase 61       AST (SGOT) 21       ALT (SGPT) 22       Albumin/Globulin Ratio 1.3       BUN/Creatinine Ratio 19.6   21.4   26.6     Anion Gap 9.2   7.3   10.0         Assessment & Plan   Assessment / Plan     Assessment/Plan:  Acute left subcapital femoral neck fracture status post ORIF  Mechanical fall causing above  GERD  Hypertension  Migraine headache     Continue to monitor in the hospital for work-up and management of the above  Postop from left femur ORIF  Continue tramadol, Norco, or IV morphine as needed for pain  Continue IV Zofran, add Compazine as needed for nausea/vomiting  DC Mg catheter  Continue home propanolol for hypertension  Continue home Prilosec daily for GERD  Atarax as needed for anxiety  Lovenox for DVT prophylaxis  PT/OT following, plan is likely home with home health, possibly tomorrow  Trend renal function and electrolytes with a.m. BMP, magnesium   Trend Hgb and WBC with a.m. CBC     Discussed case with: Bedside RN, orthopedic surgery    DVT prophylaxis:  Medical and mechanical DVT prophylaxis orders are present.    CODE STATUS:   Level Of Support Discussed With: Patient  Code Status (Patient has no pulse and is not  breathing): CPR (Attempt to Resuscitate)  Medical Interventions (Patient has pulse or is breathing): Full Support

## 2023-04-14 NOTE — PLAN OF CARE
Goal Outcome Evaluation:  Plan of Care Reviewed With: patient        Progress: no change  Outcome Evaluation: pt medicated x1 for left hip pain, x1 for nausea, with voiced relief. welch intact to bsd. no changes in pt's status.

## 2023-04-14 NOTE — PLAN OF CARE
Problem: Adult Inpatient Plan of Care  Goal: Plan of Care Review  Outcome: Ongoing, Progressing  Goal: Patient-Specific Goal (Individualized)  Outcome: Ongoing, Progressing  Goal: Absence of Hospital-Acquired Illness or Injury  Outcome: Ongoing, Progressing  Intervention: Identify and Manage Fall Risk  Recent Flowsheet Documentation  Taken 4/14/2023 1815 by Negrita Del Castillo RN  Safety Promotion/Fall Prevention: safety round/check completed  Taken 4/14/2023 1630 by Negrita Del Castillo RN  Safety Promotion/Fall Prevention: safety round/check completed  Taken 4/14/2023 1220 by Negrita Del Castillo RN  Safety Promotion/Fall Prevention: safety round/check completed  Taken 4/14/2023 1020 by Negrita Del Castillo RN  Safety Promotion/Fall Prevention: safety round/check completed  Taken 4/14/2023 0820 by Negrita Del Castillo RN  Safety Promotion/Fall Prevention: safety round/check completed  Taken 4/14/2023 0720 by Negrita Del Castillo RN  Safety Promotion/Fall Prevention: safety round/check completed  Intervention: Prevent Skin Injury  Recent Flowsheet Documentation  Taken 4/14/2023 0720 by Negrita Del Castillo RN  Body Position: position changed independently  Skin Protection:   adhesive use limited   tubing/devices free from skin contact  Intervention: Prevent and Manage VTE (Venous Thromboembolism) Risk  Recent Flowsheet Documentation  Taken 4/14/2023 0720 by Negrita Del Castillo RN  Activity Management: up in chair  VTE Prevention/Management:   bilateral   sequential compression devices on  Range of Motion: ROM (range of motion) performed  Intervention: Prevent Infection  Recent Flowsheet Documentation  Taken 4/14/2023 0720 by Negrita Del Castillo RN  Infection Prevention:   environmental surveillance performed   hand hygiene promoted   rest/sleep promoted   single patient room provided   visitors restricted/screened  Goal: Optimal Comfort and Wellbeing  Outcome: Ongoing, Progressing  Intervention: Monitor Pain and Promote Comfort  Recent  Flowsheet Documentation  Taken 4/14/2023 1133 by Negrita Del Castillo RN  Pain Management Interventions:   see MAR   quiet environment facilitated   care clustered   cold applied  Taken 4/14/2023 0859 by Negrita Del Castillo RN  Pain Management Interventions:   see MAR   quiet environment facilitated   cold applied   care clustered  Taken 4/14/2023 0720 by Negrita Del Castillo RN  Pain Management Interventions:   see MAR   quiet environment facilitated   care clustered   cold applied  Intervention: Provide Person-Centered Care  Recent Flowsheet Documentation  Taken 4/14/2023 0720 by Negrita Del Castillo RN  Trust Relationship/Rapport:   care explained   emotional support provided   questions answered   thoughts/feelings acknowledged  Goal: Readiness for Transition of Care  Outcome: Ongoing, Progressing     Problem: Pain Acute  Goal: Acceptable Pain Control and Functional Ability  Outcome: Ongoing, Progressing  Intervention: Prevent or Manage Pain  Recent Flowsheet Documentation  Taken 4/14/2023 1815 by Negrita Del Castillo RN  Medication Review/Management: medications reviewed  Taken 4/14/2023 1630 by Negrita Del Castillo RN  Medication Review/Management: medications reviewed  Taken 4/14/2023 1220 by Negrita Del Castillo RN  Medication Review/Management: medications reviewed  Taken 4/14/2023 1020 by Negrita Del Castillo RN  Medication Review/Management: medications reviewed  Taken 4/14/2023 0820 by Negrita Del Castillo RN  Medication Review/Management: medications reviewed  Taken 4/14/2023 0720 by Negrita Del Castillo RN  Sensory Stimulation Regulation: care clustered  Bowel Elimination Promotion:   adequate fluid intake promoted   ambulation promoted  Sleep/Rest Enhancement: awakenings minimized  Medication Review/Management: medications reviewed  Intervention: Develop Pain Management Plan  Recent Flowsheet Documentation  Taken 4/14/2023 1133 by Negrita Del Castillo RN  Pain Management Interventions:   see MAR   quiet environment facilitated    care clustered   cold applied  Taken 4/14/2023 0859 by Negrita Del Castillo RN  Pain Management Interventions:   see MAR   quiet environment facilitated   cold applied   care clustered  Taken 4/14/2023 0720 by Negrita Del Castillo RN  Pain Management Interventions:   see MAR   quiet environment facilitated   care clustered   cold applied  Intervention: Optimize Psychosocial Wellbeing  Recent Flowsheet Documentation  Taken 4/14/2023 0720 by Negrita Del Castillo RN  Supportive Measures: active listening utilized  Diversional Activities:   television   smartphone     Problem: Fall Injury Risk  Goal: Absence of Fall and Fall-Related Injury  Outcome: Ongoing, Progressing  Intervention: Identify and Manage Contributors  Recent Flowsheet Documentation  Taken 4/14/2023 1815 by Negrita Del Castillo RN  Medication Review/Management: medications reviewed  Taken 4/14/2023 1630 by Negrita Del Castillo RN  Medication Review/Management: medications reviewed  Taken 4/14/2023 1220 by Negrita Del Castillo RN  Medication Review/Management: medications reviewed  Taken 4/14/2023 1020 by Negrita Del Castillo RN  Medication Review/Management: medications reviewed  Taken 4/14/2023 0820 by Negrita Del Castillo RN  Medication Review/Management: medications reviewed  Taken 4/14/2023 0720 by Negrita Del Castillo RN  Medication Review/Management: medications reviewed  Self-Care Promotion: independence encouraged  Intervention: Promote Injury-Free Environment  Recent Flowsheet Documentation  Taken 4/14/2023 1815 by Negrita Del Castillo RN  Safety Promotion/Fall Prevention: safety round/check completed  Taken 4/14/2023 1630 by Negrita Del Castillo RN  Safety Promotion/Fall Prevention: safety round/check completed  Taken 4/14/2023 1220 by Negrita Del Castillo RN  Safety Promotion/Fall Prevention: safety round/check completed  Taken 4/14/2023 1020 by Negrita Del Castillo RN  Safety Promotion/Fall Prevention: safety round/check completed  Taken 4/14/2023 0820 by Negrita Del Castillo  RN  Safety Promotion/Fall Prevention: safety round/check completed  Taken 4/14/2023 0720 by Negrita Del Castillo RN  Safety Promotion/Fall Prevention: safety round/check completed     Problem: Skin Injury Risk Increased  Goal: Skin Health and Integrity  Outcome: Ongoing, Progressing  Intervention: Optimize Skin Protection  Recent Flowsheet Documentation  Taken 4/14/2023 0720 by Negrita Del Castillo RN  Pressure Reduction Techniques:   frequent weight shift encouraged   heels elevated off bed   weight shift assistance provided   sit time limited to 2 hours  Head of Bed (HOB) Positioning: HOB elevated  Pressure Reduction Devices:   pressure-redistributing mattress utilized   positioning supports utilized   heel offloading device utilized  Skin Protection:   adhesive use limited   tubing/devices free from skin contact     Problem: Hypertension Comorbidity  Goal: Blood Pressure in Desired Range  Outcome: Ongoing, Progressing  Intervention: Maintain Blood Pressure Management  Recent Flowsheet Documentation  Taken 4/14/2023 1815 by Negrita Del Castillo RN  Medication Review/Management: medications reviewed  Taken 4/14/2023 1630 by Negrita Del Castillo RN  Medication Review/Management: medications reviewed  Taken 4/14/2023 1220 by Negrita Del Castillo RN  Medication Review/Management: medications reviewed  Taken 4/14/2023 1020 by Negrita Del Castillo RN  Medication Review/Management: medications reviewed  Taken 4/14/2023 0820 by Negrita Del Castillo RN  Medication Review/Management: medications reviewed  Taken 4/14/2023 0720 by Negrita Del Castillo RN  Medication Review/Management: medications reviewed   Goal Outcome Evaluation:   Pt has had no new changes throughout shift and continues to remain stable. Pt had left hip cannulated screw placed on 4/13 by Dr. Nunez. Pt has had pain and nausea that is controlled with PRN medication. Pt had welch DC this morning. Pt has been voiding without issue. Pt is x1 assist to the BSC. Pt is wanting to DC home  with home health. She is requesting DME walker and 3 in 1. Equipment is at bedside.

## 2023-04-15 ENCOUNTER — READMISSION MANAGEMENT (OUTPATIENT)
Dept: CALL CENTER | Facility: HOSPITAL | Age: 68
End: 2023-04-15
Payer: MEDICARE

## 2023-04-15 VITALS
OXYGEN SATURATION: 92 % | HEART RATE: 86 BPM | SYSTOLIC BLOOD PRESSURE: 136 MMHG | DIASTOLIC BLOOD PRESSURE: 92 MMHG | TEMPERATURE: 98.1 F | RESPIRATION RATE: 16 BRPM

## 2023-04-15 LAB
ANION GAP SERPL CALCULATED.3IONS-SCNC: 7 MMOL/L (ref 5–15)
BASOPHILS # BLD AUTO: 0.02 10*3/MM3 (ref 0–0.2)
BASOPHILS NFR BLD AUTO: 0.3 % (ref 0–1.5)
BUN SERPL-MCNC: 14 MG/DL (ref 8–23)
BUN/CREAT SERPL: 18.2 (ref 7–25)
CALCIUM SPEC-SCNC: 9.4 MG/DL (ref 8.6–10.5)
CHLORIDE SERPL-SCNC: 102 MMOL/L (ref 98–107)
CO2 SERPL-SCNC: 28 MMOL/L (ref 22–29)
CREAT SERPL-MCNC: 0.77 MG/DL (ref 0.57–1)
DEPRECATED RDW RBC AUTO: 40.8 FL (ref 37–54)
EGFRCR SERPLBLD CKD-EPI 2021: 84.7 ML/MIN/1.73
EOSINOPHIL # BLD AUTO: 0.24 10*3/MM3 (ref 0–0.4)
EOSINOPHIL NFR BLD AUTO: 3.2 % (ref 0.3–6.2)
ERYTHROCYTE [DISTWIDTH] IN BLOOD BY AUTOMATED COUNT: 12.3 % (ref 12.3–15.4)
GLUCOSE SERPL-MCNC: 122 MG/DL (ref 65–99)
HCT VFR BLD AUTO: 39.6 % (ref 34–46.6)
HGB BLD-MCNC: 13.1 G/DL (ref 12–15.9)
IMM GRANULOCYTES # BLD AUTO: 0.02 10*3/MM3 (ref 0–0.05)
IMM GRANULOCYTES NFR BLD AUTO: 0.3 % (ref 0–0.5)
LYMPHOCYTES # BLD AUTO: 1.74 10*3/MM3 (ref 0.7–3.1)
LYMPHOCYTES NFR BLD AUTO: 23.5 % (ref 19.6–45.3)
MAGNESIUM SERPL-MCNC: 1.7 MG/DL (ref 1.6–2.4)
MCH RBC QN AUTO: 30.1 PG (ref 26.6–33)
MCHC RBC AUTO-ENTMCNC: 33.1 G/DL (ref 31.5–35.7)
MCV RBC AUTO: 91 FL (ref 79–97)
MONOCYTES # BLD AUTO: 0.88 10*3/MM3 (ref 0.1–0.9)
MONOCYTES NFR BLD AUTO: 11.9 % (ref 5–12)
NEUTROPHILS NFR BLD AUTO: 4.5 10*3/MM3 (ref 1.7–7)
NEUTROPHILS NFR BLD AUTO: 60.8 % (ref 42.7–76)
NRBC BLD AUTO-RTO: 0 /100 WBC (ref 0–0.2)
PHOSPHATE SERPL-MCNC: 2.3 MG/DL (ref 2.5–4.5)
PLATELET # BLD AUTO: 165 10*3/MM3 (ref 140–450)
PMV BLD AUTO: 10.2 FL (ref 6–12)
POTASSIUM SERPL-SCNC: 5.1 MMOL/L (ref 3.5–5.2)
RBC # BLD AUTO: 4.35 10*6/MM3 (ref 3.77–5.28)
SODIUM SERPL-SCNC: 137 MMOL/L (ref 136–145)
WBC NRBC COR # BLD: 7.4 10*3/MM3 (ref 3.4–10.8)

## 2023-04-15 PROCEDURE — 25010000002 PROCHLORPERAZINE 10 MG/2ML SOLUTION: Performed by: INTERNAL MEDICINE

## 2023-04-15 PROCEDURE — 85025 COMPLETE CBC W/AUTO DIFF WBC: CPT | Performed by: INTERNAL MEDICINE

## 2023-04-15 PROCEDURE — 80048 BASIC METABOLIC PNL TOTAL CA: CPT | Performed by: INTERNAL MEDICINE

## 2023-04-15 PROCEDURE — 83735 ASSAY OF MAGNESIUM: CPT | Performed by: INTERNAL MEDICINE

## 2023-04-15 PROCEDURE — 94799 UNLISTED PULMONARY SVC/PX: CPT

## 2023-04-15 PROCEDURE — 63710000001 ONDANSETRON PER 8 MG: Performed by: ORTHOPAEDIC SURGERY

## 2023-04-15 PROCEDURE — 25010000002 ENOXAPARIN PER 10 MG: Performed by: ORTHOPAEDIC SURGERY

## 2023-04-15 PROCEDURE — 99239 HOSP IP/OBS DSCHRG MGMT >30: CPT | Performed by: INTERNAL MEDICINE

## 2023-04-15 PROCEDURE — 84100 ASSAY OF PHOSPHORUS: CPT | Performed by: INTERNAL MEDICINE

## 2023-04-15 PROCEDURE — 25010000002 ONDANSETRON PER 1 MG: Performed by: ORTHOPAEDIC SURGERY

## 2023-04-15 RX ORDER — AMOXICILLIN 250 MG
1 CAPSULE ORAL DAILY PRN
Qty: 14 TABLET | Refills: 0 | Status: SHIPPED | OUTPATIENT
Start: 2023-04-15

## 2023-04-15 RX ORDER — ONDANSETRON 4 MG/1
4 TABLET, ORALLY DISINTEGRATING ORAL EVERY 8 HOURS PRN
Qty: 10 TABLET | Refills: 0 | Status: SHIPPED | OUTPATIENT
Start: 2023-04-15 | End: 2023-04-26 | Stop reason: SDUPTHER

## 2023-04-15 RX ORDER — ASPIRIN 325 MG
325 TABLET, DELAYED RELEASE (ENTERIC COATED) ORAL DAILY
Qty: 21 TABLET | Refills: 0 | Status: SHIPPED | OUTPATIENT
Start: 2023-04-15

## 2023-04-15 RX ORDER — TRAMADOL HYDROCHLORIDE 50 MG/1
50 TABLET ORAL EVERY 6 HOURS PRN
Qty: 24 TABLET | Refills: 0 | Status: SHIPPED | OUTPATIENT
Start: 2023-04-15 | End: 2023-04-21

## 2023-04-15 RX ADMIN — PROPRANOLOL HYDROCHLORIDE 60 MG: 40 TABLET ORAL at 11:15

## 2023-04-15 RX ADMIN — ONDANSETRON HYDROCHLORIDE 4 MG: 4 TABLET, FILM COATED ORAL at 09:24

## 2023-04-15 RX ADMIN — ENOXAPARIN SODIUM 40 MG: 100 INJECTION SUBCUTANEOUS at 11:15

## 2023-04-15 RX ADMIN — POTASSIUM & SODIUM PHOSPHATES POWDER PACK 280-160-250 MG 2 PACKET: 280-160-250 PACK at 11:16

## 2023-04-15 RX ADMIN — PROCHLORPERAZINE EDISYLATE 5 MG: 5 INJECTION, SOLUTION INTRAMUSCULAR; INTRAVENOUS at 03:14

## 2023-04-15 RX ADMIN — PANTOPRAZOLE SODIUM 40 MG: 40 TABLET, DELAYED RELEASE ORAL at 05:57

## 2023-04-15 RX ADMIN — Medication 10 ML: at 09:17

## 2023-04-15 NOTE — PLAN OF CARE
Goal Outcome Evaluation:         Pt stable throughout shift, was constipated at beginning of shift but has since had several BMS. Voiding without difficulty. Partial weight bearing to left lower extremity. Dressing dry and intact at time of dc. Pt ambulating well with 1x assist and walker. Pain has been well controlled. Intermittent nausea but pt reports this is much better since she had a BM. Pt has BSC and walker delivered to her at bedside and will be followed by [facility].

## 2023-04-15 NOTE — OUTREACH NOTE
Prep Survey    Flowsheet Row Responses   [facility] facility patient discharged from? [facility]   Is LACE score < 7 ? Yes   Eligibility Not Eligible   What are the reasons patient is not eligible? Other   Does the patient have one of the following disease processes/diagnoses(primary or secondary)? Stroke   Prep survey completed? Yes          Liz HERRON - Registered Nurse

## 2023-04-15 NOTE — PLAN OF CARE
Goal Outcome Evaluation:  Plan of Care Reviewed With: patient        Progress: improving  Outcome Evaluation: Pt alert and able to make needs known. Pain controlled with PRN Tramadol. Pt cont with partial weight bearing to left lower extremity-sarahi well. Pt up with one person assist, gait belt and rolling walker for transfers-sarahi. well. Pt hoping to go home today with home health for therapy, has DME in room currently. Pt requested suppository for constipation with AM meds, advised would pass on in report to day shift nurse.

## 2023-04-15 NOTE — DISCHARGE INSTRUCTIONS
Begin daily dry dressing changed to incisional site starting 4/16/2023. Clean with alcohol swabs and replace dry dressing daily. Continue until follow up appointment with Kelvin CAPELLAN

## 2023-04-15 NOTE — SIGNIFICANT NOTE
04/15/23 0739   Plan   Plan Comments  --    Final Discharge Disposition Code 06 - home with home health care   Final Note Home with [facility].

## 2023-04-15 NOTE — DISCHARGE SUMMARY
[facility]         HOSPITALIST  DISCHARGE SUMMARY    Patient Name: Kimberly Landers  : 1955  MRN: [id number]    Date of Admission: 2023  Date of Discharge: 4/15/2023  Primary Care Physician: Juliana Dial APRN    Consults     Date and Time Order Name Status Description    2023  4:53 PM Inpatient Orthopedic Surgery Consult      2023  4:42 PM Inpatient Orthopedic Surgery Consult            Active and Resolved Hospital Problems:  Active Hospital Problems    Diagnosis POA   • **Closed left hip fracture [S72.002A] Yes   • Femoral neck fracture [S72.009A] Unknown      Resolved Hospital Problems   No resolved problems to display.   Acute left subcapital femoral neck fracture status post ORIF  Mechanical fall causing above  Nausea and vomiting  GERD  Hypertension  Migraine headache    Hospital Course     Hospital Course:  Kimberly Landers is a 67 y.o. female PMH GERD, hypertension, migraine headaches who presented to [facility] after a fall at home.  She states she was walking through her living room, tripped on her rug and sustained a mechanical fall.  She noticed immediate 10/10 left hip pain.  She presented to [place] ED where she was found to have a left subcapital femoral neck fracture.  Case discussed with Dr. Nunez in orthopedic surgery at [facility] who has agreed to consult on the patient.  She was transferred to our facility.  Orthopedic surgery was consulted and took patient to the OR on  for ORIF.  She tolerated procedure well.  She did have postoperative nausea and vomiting likely related to narcotics.  She did a little bit better with tramadol.  Worked with PT/OT.  She was discharged home with home health in stable condition on 4/15/2023.  Recommend follow-up with PCP in 1 week, orthopedic surgery in 2 to 3 weeks.     Day of Discharge     Vital Signs:  Temp:  [97.5 °F (36.4 °C)-98.1 °F (36.7 °C)] 97.5 °F (36.4 °C)  Heart Rate:  [60-74]  74  Resp:  [16] 16  BP: (131-175)/(53-86) 152/69  Physical Exam:   Gen: NAD, WDWN  ENT: PERRL, EOMI   CV: RRR no MRG  Pulm: CTAB, no w/r/r  GI: Abd soft, NTND, +bs  Neuro: Left leg with expected postop ROM, otherwise moving all extremities spontaneously, CN II-XII grossly intact   Psych: A&O*3, normal mood and affect  Skin: Left hip with dressing that is clean and dry, no lesions or rashes noted      Discharge Details        Discharge Medications      New Medications      Instructions Start Date   apixaban 2.5 MG tablet tablet  Commonly known as: ELIQUIS   2.5 mg, Oral, 2 Times Daily      sennosides-docusate 8.6-50 MG per tablet  Commonly known as: PERICOLACE   1 tablet, Oral, Daily PRN      traMADol 50 MG tablet  Commonly known as: ULTRAM   50 mg, Oral, Every 6 Hours PRN         Changes to Medications      Instructions Start Date   aspirin  MG tablet  What changed:   · medication strength  · how much to take   325 mg, Oral, Daily      ondansetron ODT 4 MG disintegrating tablet  Commonly known as: ZOFRAN-ODT  What changed: reasons to take this   4 mg, Translingual, Every 8 Hours PRN         Continue These Medications      Instructions Start Date   Cequa 0.09 % solution  Generic drug: cycloSPORINE (PF)   1 drop, Both Eyes, 2 Times Daily      cyclobenzaprine 10 MG tablet  Commonly known as: FLEXERIL   10 mg, Oral, 3 Times Daily PRN, for muscle spams      doxazosin 2 MG tablet  Commonly known as: CARDURA   2 mg, Oral, Nightly      hydrOXYzine 25 MG tablet  Commonly known as: ATARAX   Take 1 tablet by mouth 3 (Three) Times a Day As Needed for Anxiety.      Magnesium 400 MG tablet   1 tablet, Oral, Daily      omeprazole 40 MG capsule  Commonly known as: priLOSEC   40 mg, Oral, Daily      propranolol 60 MG tablet  Commonly known as: INDERAL   60 mg, Oral, 2 Times Daily             Allergies   Allergen Reactions   • Alprazolam Shortness Of Breath   • Propoxyphene Nausea And Vomiting     Hot flashes and sweaty     •  Codeine Nausea And Vomiting       Discharge Disposition:  Home-Health Care Svc    Diet:  Hospital:  Diet Order   Procedures   • Diet: Regular/House Diet; Texture: Regular Texture (IDDSI 7); Fluid Consistency: Thin (IDDSI 0)       Discharge Activity:   Activity Instructions     Activity as Tolerated            CODE STATUS:  Code Status and Medical Interventions:   Ordered at: 04/12/23 1653     Level Of Support Discussed With:    Patient     Code Status (Patient has no pulse and is not breathing):    CPR (Attempt to Resuscitate)     Medical Interventions (Patient has pulse or is breathing):    Full Support         No future appointments.    Additional Instructions for the Follow-ups that You Need to Schedule     Discharge Follow-up with PCP   As directed       Currently Documented PCP:    Juliana Dial APRN    PCP Phone Number:    [phone]     Follow Up Details: 3-5 days         Discharge Follow-up with Specified Provider: Orthopedic surgery; 2 Weeks   As directed      To: Orthopedic surgery    Follow Up: 2 Weeks               Pertinent  and/or Most Recent Results     PROCEDURES:   ORIF     LAB RESULTS:      Lab 04/15/23  0410 04/14/23  0413 04/13/23  0412 04/12/23  1754   WBC 7.40 6.37 6.78 7.22   HEMOGLOBIN 13.1 12.3 13.8 13.6   HEMATOCRIT 39.6 37.5 42.1 41.4   PLATELETS 165 133* 149 147   NEUTROS ABS 4.50 4.99 4.02 4.99   IMMATURE GRANS (ABS) 0.02 0.03 0.02 0.02   LYMPHS ABS 1.74 0.76 1.67 1.33   MONOS ABS 0.88 0.57 0.72 0.61   EOS ABS 0.24 0.01 0.33 0.24   MCV 91.0 92.6 91.3 90.2         Lab 04/15/23  0410 04/14/23  0414 04/13/23  0412 04/12/23  1754   SODIUM 137 138 139 137   POTASSIUM 5.1 4.6 4.3 4.1   CHLORIDE 102 103 101 100   CO2 28.0 25.0 30.7* 27.8   ANION GAP 7.0 10.0 7.3 9.2   BUN 14 17 21 18   CREATININE 0.77 0.64 0.98 0.92   EGFR 84.7 97.0 63.4 68.4   GLUCOSE 122* 137* 113* 105*   CALCIUM 9.4 8.8 9.4 8.9   MAGNESIUM 1.7 1.9 2.1 1.9   PHOSPHORUS 2.3* 3.5  --  3.5         Lab 04/12/23  5804    TOTAL PROTEIN 6.2   ALBUMIN 3.5   GLOBULIN 2.7   ALT (SGPT) 22   AST (SGOT) 21   BILIRUBIN 0.8   ALK PHOS 61                 Lab 04/12/23  1227   ANTIBODY SCREEN NEGATIVE         Brief Urine Lab Results     None        Microbiology Results (last 10 days)     ** No results found for the last 240 hours. **          CT Head Without Contrast    Result Date: 4/12/2023  Impression: Normal unenhanced CT scan of the brain. Acute right maxillary and sphenoid sinusitis. Electronically Signed: Fransisco Xiong MD 2023/04/12 at 11:11 CDT Reading Location ID and State: 994 / [place] Tel [phone], Service support  [phone], Fax [phone]    CT Lumbar Spine Without Contrast    Result Date: 4/12/2023  Impression: No acute fracture or subluxation. Electronically Signed: Fransisco Xiong MD 2023/04/12 at 11:16 CDT Reading Location ID and State: [address] / [place] Tel [phone], Service support  [phone], Fax [phone]    XR Chest 1 View    Result Date: 4/12/2023  Impression: Normal x-ray examination of the chest. Electronically Signed: Fransisco Xiong MD 2023/04/12 at 11:53 CDT Reading Location ID and State: [address] / [place] Tel [phone], Service support  [phone], Fax [phone]    CT pelvis without contrast    Result Date: 4/12/2023  Impression: Acute impacted fracture of the left subcapital femoral neck. Electronically Signed: Fransisco Xiong MD 2023/04/12 at 11:18 CDT Reading Location ID and State: [address] / [place] Tel [phone], Service support  [phone], Fax [phone]    FL Surgery Fluoro    Result Date: 4/13/2023  Impression:   1. Fluoroscopic imaging obtained without a radiologist present.  Please see performing provider note for full detail      HUMBERTO VAUGHN MD       Electronically Signed and Approved By: HUMBERTO VAUGHN MD on 4/13/2023 at 14:10             XR Hip With or Without Pelvis 2 - 3 View Left    Result Date: 4/13/2023  Impression:   1. Fluoroscopic imaging obtained without a  radiologist present.  Please see performing provider note for full detail      HUMBERTO VAUGHN MD       Electronically Signed and Approved By: HUMBERTO VAUGHN MD on 4/13/2023 at 14:10             XR Hip With or Without Pelvis 2 - 3 View Left    Result Date: 4/12/2023  Impression:   1. Nondisplaced impacted left subcapital femoral neck fracture.       PERRY LEMUS MD       Electronically Signed and Approved By: PERRY LEMUS MD on 4/12/2023 at 17:35                   Time spent on Discharge including face to face service:  34 minutes    Electronically signed by Wally Sainz MD, 04/15/23, 1:00 PM EDT.

## 2023-04-23 ENCOUNTER — APPOINTMENT (OUTPATIENT)
Dept: GENERAL RADIOLOGY | Facility: HOSPITAL | Age: 68
End: 2023-04-23
Payer: MEDICARE

## 2023-04-23 ENCOUNTER — APPOINTMENT (OUTPATIENT)
Dept: CT IMAGING | Facility: HOSPITAL | Age: 68
End: 2023-04-23
Payer: MEDICARE

## 2023-04-23 ENCOUNTER — HOSPITAL ENCOUNTER (EMERGENCY)
Facility: HOSPITAL | Age: 68
Discharge: HOME | End: 2023-04-23
Attending: EMERGENCY MEDICINE | Admitting: EMERGENCY MEDICINE
Payer: MEDICARE

## 2023-04-23 VITALS
HEART RATE: 65 BPM | OXYGEN SATURATION: 94 % | SYSTOLIC BLOOD PRESSURE: 154 MMHG | HEIGHT: 63 IN | BODY MASS INDEX: 24.3 KG/M2 | TEMPERATURE: 98.6 F | WEIGHT: 137.13 LBS | RESPIRATION RATE: 14 BRPM | DIASTOLIC BLOOD PRESSURE: 107 MMHG

## 2023-04-23 DIAGNOSIS — J32.0 MAXILLARY SINUSITIS, UNSPECIFIED CHRONICITY: ICD-10-CM

## 2023-04-23 DIAGNOSIS — R51.9 NONINTRACTABLE HEADACHE, UNSPECIFIED CHRONICITY PATTERN, UNSPECIFIED HEADACHE TYPE: Primary | ICD-10-CM

## 2023-04-23 LAB
ALBUMIN SERPL-MCNC: 3.2 G/DL (ref 3.5–5.2)
ALBUMIN/GLOB SERPL: 1 G/DL
ALP SERPL-CCNC: 82 U/L (ref 39–117)
ALT SERPL W P-5'-P-CCNC: 14 U/L (ref 1–33)
ANION GAP SERPL CALCULATED.3IONS-SCNC: 8.7 MMOL/L (ref 5–15)
AST SERPL-CCNC: 22 U/L (ref 1–32)
BACTERIA UR QL AUTO: ABNORMAL /HPF
BASOPHILS # BLD AUTO: 0.04 10*3/MM3 (ref 0–0.2)
BASOPHILS NFR BLD AUTO: 0.5 % (ref 0–1.5)
BILIRUB SERPL-MCNC: 0.3 MG/DL (ref 0–1.2)
BILIRUB UR QL STRIP: NEGATIVE
BUN SERPL-MCNC: 9 MG/DL (ref 8–23)
BUN/CREAT SERPL: 11.3 (ref 7–25)
CALCIUM SPEC-SCNC: 9.2 MG/DL (ref 8.6–10.5)
CHLORIDE SERPL-SCNC: 102 MMOL/L (ref 98–107)
CLARITY UR: CLEAR
CO2 SERPL-SCNC: 27.3 MMOL/L (ref 22–29)
COLOR UR: YELLOW
CREAT SERPL-MCNC: 0.8 MG/DL (ref 0.57–1)
DEPRECATED RDW RBC AUTO: 38.8 FL (ref 37–54)
EGFRCR SERPLBLD CKD-EPI 2021: 80.9 ML/MIN/1.73
EOSINOPHIL # BLD AUTO: 0.15 10*3/MM3 (ref 0–0.4)
EOSINOPHIL NFR BLD AUTO: 1.7 % (ref 0.3–6.2)
ERYTHROCYTE [DISTWIDTH] IN BLOOD BY AUTOMATED COUNT: 12.2 % (ref 12.3–15.4)
GLOBULIN UR ELPH-MCNC: 3.2 GM/DL
GLUCOSE SERPL-MCNC: 124 MG/DL (ref 65–99)
GLUCOSE UR STRIP-MCNC: NEGATIVE MG/DL
HCT VFR BLD AUTO: 37.3 % (ref 34–46.6)
HGB BLD-MCNC: 12.7 G/DL (ref 12–15.9)
HGB UR QL STRIP.AUTO: NEGATIVE
HOLD SPECIMEN: NORMAL
HOLD SPECIMEN: NORMAL
HYALINE CASTS UR QL AUTO: ABNORMAL /LPF
IMM GRANULOCYTES # BLD AUTO: 0.06 10*3/MM3 (ref 0–0.05)
IMM GRANULOCYTES NFR BLD AUTO: 0.7 % (ref 0–0.5)
KETONES UR QL STRIP: NEGATIVE
LEUKOCYTE ESTERASE UR QL STRIP.AUTO: ABNORMAL
LYMPHOCYTES # BLD AUTO: 1.91 10*3/MM3 (ref 0.7–3.1)
LYMPHOCYTES NFR BLD AUTO: 22 % (ref 19.6–45.3)
MAGNESIUM SERPL-MCNC: 1.7 MG/DL (ref 1.6–2.4)
MCH RBC QN AUTO: 29.5 PG (ref 26.6–33)
MCHC RBC AUTO-ENTMCNC: 34 G/DL (ref 31.5–35.7)
MCV RBC AUTO: 86.7 FL (ref 79–97)
MONOCYTES # BLD AUTO: 0.61 10*3/MM3 (ref 0.1–0.9)
MONOCYTES NFR BLD AUTO: 7 % (ref 5–12)
NEUTROPHILS NFR BLD AUTO: 5.9 10*3/MM3 (ref 1.7–7)
NEUTROPHILS NFR BLD AUTO: 68.1 % (ref 42.7–76)
NITRITE UR QL STRIP: NEGATIVE
NRBC BLD AUTO-RTO: 0 /100 WBC (ref 0–0.2)
PH UR STRIP.AUTO: 7 [PH] (ref 5–8)
PLATELET # BLD AUTO: 301 10*3/MM3 (ref 140–450)
PMV BLD AUTO: 9.5 FL (ref 6–12)
POTASSIUM SERPL-SCNC: 3.9 MMOL/L (ref 3.5–5.2)
PROT SERPL-MCNC: 6.4 G/DL (ref 6–8.5)
PROT UR QL STRIP: NEGATIVE
QT INTERVAL: 413 MS
RBC # BLD AUTO: 4.3 10*6/MM3 (ref 3.77–5.28)
RBC # UR STRIP: ABNORMAL /HPF
REF LAB TEST METHOD: ABNORMAL
SODIUM SERPL-SCNC: 138 MMOL/L (ref 136–145)
SP GR UR STRIP: 1.02 (ref 1–1.03)
SQUAMOUS #/AREA URNS HPF: ABNORMAL /HPF
TROPONIN T SERPL HS-MCNC: 9 NG/L
UROBILINOGEN UR QL STRIP: ABNORMAL
WBC # UR STRIP: ABNORMAL /HPF
WBC NRBC COR # BLD: 8.67 10*3/MM3 (ref 3.4–10.8)
WHOLE BLOOD HOLD COAG: NORMAL
WHOLE BLOOD HOLD SPECIMEN: NORMAL

## 2023-04-23 PROCEDURE — 81001 URINALYSIS AUTO W/SCOPE: CPT | Performed by: EMERGENCY MEDICINE

## 2023-04-23 PROCEDURE — 83735 ASSAY OF MAGNESIUM: CPT

## 2023-04-23 PROCEDURE — 70450 CT HEAD/BRAIN W/O DYE: CPT

## 2023-04-23 PROCEDURE — 93005 ELECTROCARDIOGRAM TRACING: CPT | Performed by: EMERGENCY MEDICINE

## 2023-04-23 PROCEDURE — 80053 COMPREHEN METABOLIC PANEL: CPT

## 2023-04-23 PROCEDURE — 25010000002 ONDANSETRON PER 1 MG: Performed by: EMERGENCY MEDICINE

## 2023-04-23 PROCEDURE — 84484 ASSAY OF TROPONIN QUANT: CPT

## 2023-04-23 PROCEDURE — 85025 COMPLETE CBC W/AUTO DIFF WBC: CPT

## 2023-04-23 PROCEDURE — 99284 EMERGENCY DEPT VISIT MOD MDM: CPT

## 2023-04-23 PROCEDURE — 96374 THER/PROPH/DIAG INJ IV PUSH: CPT

## 2023-04-23 PROCEDURE — 93005 ELECTROCARDIOGRAM TRACING: CPT

## 2023-04-23 PROCEDURE — 71045 X-RAY EXAM CHEST 1 VIEW: CPT

## 2023-04-23 RX ORDER — SODIUM CHLORIDE 0.9 % (FLUSH) 0.9 %
10 SYRINGE (ML) INJECTION AS NEEDED
Status: DISCONTINUED | OUTPATIENT
Start: 2023-04-23 | End: 2023-04-24 | Stop reason: HOSPADM

## 2023-04-23 RX ORDER — AMOXICILLIN AND CLAVULANATE POTASSIUM 875; 125 MG/1; MG/1
1 TABLET, FILM COATED ORAL 2 TIMES DAILY
Qty: 14 TABLET | Refills: 0 | Status: SHIPPED | OUTPATIENT
Start: 2023-04-23 | End: 2023-04-30

## 2023-04-23 RX ORDER — CETIRIZINE HYDROCHLORIDE 10 MG/1
10 TABLET ORAL DAILY
Qty: 30 TABLET | Refills: 0 | Status: SHIPPED | OUTPATIENT
Start: 2023-04-23

## 2023-04-23 RX ORDER — FLUTICASONE PROPIONATE 50 MCG
2 SPRAY, SUSPENSION (ML) NASAL DAILY
Qty: 15.8 ML | Refills: 0 | Status: SHIPPED | OUTPATIENT
Start: 2023-04-23

## 2023-04-23 RX ORDER — ONDANSETRON 2 MG/ML
4 INJECTION INTRAMUSCULAR; INTRAVENOUS ONCE
Status: COMPLETED | OUTPATIENT
Start: 2023-04-23 | End: 2023-04-23

## 2023-04-23 RX ADMIN — ONDANSETRON 4 MG: 2 INJECTION INTRAMUSCULAR; INTRAVENOUS at 22:13

## 2023-04-24 NOTE — ED NOTES
Patient is nonweight bearing to lower extremity due to recent surgery. Unable to do orthostatic  on her.

## 2023-04-24 NOTE — ED PROVIDER NOTES
Time: 8:09 PM EDT  Date of encounter:  4/23/2023  Independent Historian/Clinical History and Information was obtained by:   Patient  Chief Complaint   Patient presents with   • Headache       History is limited by: N/A    History of Present Illness:  Patient is a 67 y.o. year old female who presents to the emergency department for evaluation of headache. Patient states she fractured her femur two weeks ago secondary to a fall. She had a CT of her head completed at that time but she has had worsening headache since then. She had one episode of vomiting during the CT but this has resolved. She locates the headache to the front of her head, and she describes it as dull and aching. She notes minimal relief with Tylenol today. She does not have a headache right now, but she notes tinnitus at this time. Patient also reports dizziness today and numbness over her mid left face. She denies vision changes. Patient also notes some sinus pain. She notes some sinus drainage, but she denies congestion. Patient denies experiencing similar symptoms previously.    Patient has been on Eliquis since surgery, but she took the last one today and is taking aspirin from now on.    HPI    Patient Care Team  Primary Care Provider: Juliana Dial APRN    Past Medical History:     Allergies   Allergen Reactions   • Alprazolam Shortness Of Breath   • Propoxyphene Nausea And Vomiting     Hot flashes and sweaty     • Codeine Nausea And Vomiting     Past Medical History:   Diagnosis Date   • Chronic kidney disease    • GERD (gastroesophageal reflux disease)    • Hypertension      Past Surgical History:   Procedure Laterality Date   • COLONOSCOPY     • COLONOSCOPY N/A 5/18/2022    Procedure: COLONOSCOPY WITH BX;  Surgeon: Gordo Lange MD;  Location: [facility] ENDOSCOPY;  Service: Gastroenterology;  Laterality: N/A;  DIVERTICULOSIS   • DILATATION AND CURETTAGE      x3   • ENDOSCOPY N/A 5/18/2022    Procedure: ESOPHAGOGASTRODUODENOSCOPY  WITH BX;  Surgeon: Gordo Lange MD;  Location: [facility] ENDOSCOPY;  Service: Gastroenterology;  Laterality: N/A;  NORMAL EGD   • HIP CANNULATED SCREW PLACEMENT Left 4/13/2023    Procedure: HIP CANNULATED SCREW PLACEMENT;  Surgeon: Sam Nunez MD;  Location: [facility] MAIN OR;  Service: Orthopedics;  Laterality: Left;     Family History   Problem Relation Age of Onset   • Cancer Mother    • Colon cancer Neg Hx    • Malig Hyperthermia Neg Hx        Home Medications:  Prior to Admission medications    Medication Sig Start Date End Date Taking? Authorizing Provider   apixaban (ELIQUIS) 2.5 MG tablet tablet Take 1 tablet by mouth 2 (Two) Times a Day for 7 days. DO NOT TAKE ASPIRIN WITH ELIQUIS. 4/15/23 4/22/23  Wally Patterson MD   aspirin  MG tablet Take 1 tablet by mouth Daily. BEGIN AFTER FINISHING ELIQUIS. 4/15/23   Wally Patterson MD   cyclobenzaprine (FLEXERIL) 10 MG tablet Take 1 tablet by mouth 3 (Three) Times a Day As Needed. for muscle spams 4/10/23   Holland Grewal MD   cycloSPORINE, PF, (Cequa) 0.09 % solution Administer 1 drop to both eyes 2 (Two) Times a Day.    Holland Grewal MD   doxazosin (CARDURA) 2 MG tablet Take 1 tablet by mouth Every Night. 3/16/23   Holland Grewal MD   hydrOXYzine (ATARAX) 25 MG tablet Take 1 tablet by mouth 3 (Three) Times a Day As Needed for Anxiety.    Holland Grewal MD   Magnesium 400 MG tablet Take 1 tablet by mouth Daily. 1/11/23   Holland Grewal MD   omeprazole (priLOSEC) 40 MG capsule Take 1 capsule by mouth Daily. 1/24/22   Holland Grewal MD   ondansetron ODT (ZOFRAN-ODT) 4 MG disintegrating tablet Place 1 tablet on the tongue Every 8 (Eight) Hours As Needed for Nausea or Vomiting. 4/15/23   Wally Patterson MD   propranolol (INDERAL) 60 MG tablet Take 1 tablet by mouth 2 (Two) Times a Day. 1/26/22   Holland Grewal MD   sennosides-docusate (senna-docusate sodium) 8.6-50 MG per tablet Take 1 tablet  "by mouth Daily As Needed for Constipation. 4/15/23   Wally Patterson MD        Social History:   Social History     Tobacco Use   • Smoking status: Never     Passive exposure: Yes   • Smokeless tobacco: Never   Vaping Use   • Vaping Use: Never used   Substance Use Topics   • Alcohol use: Never   • Drug use: Never         Review of Systems:  Review of Systems   Constitutional: Negative for chills and fever.   HENT: Positive for postnasal drip, sinus pain and tinnitus. Negative for congestion, ear pain and sore throat.         Positive for sinus drainage   Eyes: Negative for pain and visual disturbance.   Respiratory: Negative for cough, chest tightness and shortness of breath.    Cardiovascular: Negative for chest pain.   Gastrointestinal: Negative for abdominal pain, diarrhea, nausea and vomiting.   Genitourinary: Negative for flank pain and hematuria.   Musculoskeletal: Negative for joint swelling.   Skin: Negative for pallor.   Neurological: Positive for dizziness, numbness and headaches. Negative for seizures.   All other systems reviewed and are negative.       Physical Exam:  /80   Pulse 65   Temp 98.6 °F (37 °C) (Oral)   Resp 14   Ht 160 cm (63\")   Wt 62.2 kg (137 lb 2 oz)   SpO2 94%   BMI 24.29 kg/m²     Physical Exam  Vitals and nursing note reviewed.   Constitutional:       General: She is not in acute distress.     Appearance: Normal appearance. She is not toxic-appearing.   HENT:      Head: Normocephalic and atraumatic.      Comments: Tenderness with percussion over maxillary sinuses     Mouth/Throat:      Mouth: Mucous membranes are moist.   Eyes:      General: No scleral icterus.  Cardiovascular:      Rate and Rhythm: Normal rate and regular rhythm.      Pulses: Normal pulses.      Heart sounds: Normal heart sounds.   Pulmonary:      Effort: Pulmonary effort is normal. No respiratory distress.      Breath sounds: Normal breath sounds.   Abdominal:      General: Abdomen is flat.      " Palpations: Abdomen is soft.      Tenderness: There is no abdominal tenderness.   Musculoskeletal:         General: Normal range of motion.      Cervical back: Normal range of motion and neck supple.   Skin:     General: Skin is warm and dry.   Neurological:      General: No focal deficit present.      Mental Status: She is alert and oriented to person, place, and time. Mental status is at baseline.      Cranial Nerves: Cranial nerves 2-12 are intact.      Sensory: Sensation is intact.      Motor: Motor function is intact.                  Procedures:  Procedures      Medical Decision Making:      Comorbidities that affect care:    Chronic Kidney Disease, Hypertension    External Notes reviewed:    Hospital Discharge Summary: Patient admited for femur fracture and discharged on 4/15/23      The following orders were placed and all results were independently analyzed by me:  Orders Placed This Encounter   Procedures   • XR Chest 1 View   • CT Head Without Contrast   • Shiner Draw   • Comprehensive Metabolic Panel   • Single High Sensitivity Troponin T   • Magnesium   • Urinalysis With Microscopic If Indicated (No Culture) - Urine, Clean Catch   • CBC Auto Differential   • Urinalysis, Microscopic Only - Urine, Clean Catch   • NPO Diet NPO Type: Strict NPO   • Undress & Gown   • Cardiac Monitoring   • Continuous Pulse Oximetry   • Vital Signs   • Orthostatic Blood Pressure   • Oxygen Therapy- Nasal Cannula; 2 LPM; Titrate for SPO2: 92%, Greater Than or Equal To   • POC Glucose Once   • ECG 12 Lead ED Triage Standing Order; Weak / Dizzy / AMS   • Insert Peripheral IV   • Fall Precautions   • CBC & Differential   • Green Top (Gel)   • Lavender Top   • Gold Top - SST   • Light Blue Top       Medications Given in the Emergency Department:  Medications   sodium chloride 0.9 % flush 10 mL (has no administration in time range)   ondansetron (ZOFRAN) injection 4 mg (4 mg Intravenous Given 4/23/23 1879)        ED  Course:    The patient was initially evaluated in the triage area where orders were placed. The patient was later dispositioned by Michelle Argueta MD.      The patient was advised to stay for completion of workup which includes but is not limited to communication of labs and radiological results, reassessment and plan. The patient was advised that leaving prior to disposition by a provider could result in critical findings that are not communicated to the patient.     ED Course as of 04/23/23 2236   Sun Apr 23, 2023   2009 PROVIDER IN TRIAGE  Patient was evaluated by me in triage, Aldo Pizarro PA-C.  Orders were placed and patient is currently awaiting final results and disposition.  [MD]   2231 ECG 12 Lead ED Triage Standing Order; Weak / Dizzy / AMS  Normal sinus rhythm with rate of 72. QRS normal. VA interval normal. QTc interval is normal. No ST elevation or depression. No T wave abnormalities. No significant change when compared to dimas. This EKG was interpreted by me.  [LD]      ED Course User Index  [LD] Michelle Argueta MD  [MD] Aldo Pizarro PA-C       Labs:    Lab Results (last 24 hours)     Procedure Component Value Units Date/Time    Comprehensive Metabolic Panel [[id number]]  (Abnormal) Collected: 04/23/23 1944    Specimen: Blood from Arm, Left Updated: 04/23/23 2023     Glucose 124 mg/dL      BUN 9 mg/dL      Creatinine 0.80 mg/dL      Sodium 138 mmol/L      Potassium 3.9 mmol/L      Chloride 102 mmol/L      CO2 27.3 mmol/L      Calcium 9.2 mg/dL      Total Protein 6.4 g/dL      Albumin 3.2 g/dL      ALT (SGPT) 14 U/L      AST (SGOT) 22 U/L      Alkaline Phosphatase 82 U/L      Total Bilirubin 0.3 mg/dL      Globulin 3.2 gm/dL      A/G Ratio 1.0 g/dL      BUN/Creatinine Ratio 11.3     Anion Gap 8.7 mmol/L      eGFR 80.9 mL/min/1.73     Narrative:      GFR Normal >60  Chronic Kidney Disease <60  Kidney Failure <15      Single High Sensitivity Troponin T [[id number]]  (Normal)  Collected: 04/23/23 1944    Specimen: Blood from Arm, Left Updated: 04/23/23 2020     HS Troponin T 9 ng/L     Narrative:      High Sensitive Troponin T Reference Range:  <10.0 ng/L- Negative Female for AMI  <15.0 ng/L- Negative Male for AMI  >=10 - Abnormal Female indicating possible myocardial injury.  >=15 - Abnormal Male indicating possible myocardial injury.   Clinicians would have to utilize clinical acumen, EKG, Troponin, and serial changes to determine if it is an Acute Myocardial Infarction or myocardial injury due to an underlying chronic condition.         Magnesium [[id number]]  (Normal) Collected: 04/23/23 1944    Specimen: Blood from Arm, Left Updated: 04/23/23 2023     Magnesium 1.7 mg/dL     CBC & Differential [[id number]]  (Abnormal) Collected: 04/23/23 1945    Specimen: Blood Updated: 04/23/23 2016    Narrative:      The following orders were created for panel order CBC & Differential.  Procedure                               Abnormality         Status                     ---------                               -----------         ------                     CBC Auto Differential[[id number]]        Abnormal            Final result                 Please view results for these tests on the individual orders.    CBC Auto Differential [[id number]]  (Abnormal) Collected: 04/23/23 1945    Specimen: Blood Updated: 04/23/23 2016     WBC 8.67 10*3/mm3      RBC 4.30 10*6/mm3      Hemoglobin 12.7 g/dL      Hematocrit 37.3 %      MCV 86.7 fL      MCH 29.5 pg      MCHC 34.0 g/dL      RDW 12.2 %      RDW-SD 38.8 fl      MPV 9.5 fL      Platelets 301 10*3/mm3      Neutrophil % 68.1 %      Lymphocyte % 22.0 %      Monocyte % 7.0 %      Eosinophil % 1.7 %      Basophil % 0.5 %      Immature Grans % 0.7 %      Neutrophils, Absolute 5.90 10*3/mm3      Lymphocytes, Absolute 1.91 10*3/mm3      Monocytes, Absolute 0.61 10*3/mm3      Eosinophils, Absolute 0.15 10*3/mm3      Basophils, Absolute 0.04 10*3/mm3      Immature  Grans, Absolute 0.06 10*3/mm3      nRBC 0.0 /100 WBC     Urinalysis With Microscopic If Indicated (No Culture) - Urine, Clean Catch [[id number]]  (Abnormal) Collected: 04/23/23 2210    Specimen: Urine, Clean Catch Updated: 04/23/23 2222     Color, UA Yellow     Appearance, UA Clear     pH, UA 7.0     Specific Gravity, UA 1.021     Glucose, UA Negative     Ketones, UA Negative     Bilirubin, UA Negative     Blood, UA Negative     Protein, UA Negative     Leuk Esterase, UA Trace     Nitrite, UA Negative     Urobilinogen, UA 1.0 E.U./dL    Urinalysis, Microscopic Only - Urine, Clean Catch [[id number]]  (Abnormal) Collected: 04/23/23 2210    Specimen: Urine, Clean Catch Updated: 04/23/23 2223     RBC, UA 0-2 /HPF      WBC, UA 0-2 /HPF      Bacteria, UA None Seen /HPF      Squamous Epithelial Cells, UA 0-2 /HPF      Hyaline Casts, UA 0-2 /LPF      Methodology Automated Microscopy           Imaging:    CT Head Without Contrast    Result Date: 4/23/2023  PROCEDURE: CT HEAD WO CONTRAST  COMPARISON: None.  INDICATIONS: Headache, worsening, after fall on 4-.  PROTOCOL:   Standard CT imaging protocol performed.    RADIATION:   Total DLP: 1,017.2 mGy*cm   MA and/or KV were/was adjusted to minimize radiation dose.    TECHNIQUE: After obtaining the patient's consent, 130 CT images were obtained without non-ionic intravenous contrast material.  DISCUSSION:  A routine nonenhanced head CT was performed. No acute brain abnormality is identified. No acute intracranial hemorrhage. No acute infarction. No acute skull fracture. No midline shift or acute intracranial mass effect is seen.  Mild-to-moderate chronic small vessel ischemia/infarction is suspected. The extra-axial spaces and the ventricular system are mildly prominent.  There are severe degenerative changes of the bilateral temporomandibular joints (TMJs).  Chronic leftward nasal septal deviation is seen.  Moderate-to-severe age-indeterminate, but possibly acute  superimposed upon chronic in nature, sinus disease is suggested, especially involving the right maxillary antrum and the right greater than left sphenoid paranasal sinuses.  The imaged middle ear clefts (that is, the bilateral mastoid air cell complexes, bilateral middle ear cavities, and bilateral external auditory canals) are well aerated.  No acute orbital findings are appreciated.        No acute brain abnormality is seen.  No acute intracranial hemorrhage.  No definite acute infarct.  No acute skull fracture is appreciated.  Moderate-to-severe age-indeterminate sinus disease is noted.    Please note that portions of this note were completed with a voice recognition program.  MAMIE BERNABE JR, MD       Electronically Signed and Approved By: MAMIE BERNABE JR, MD on 4/23/2023 at 21:05              XR Chest 1 View    Result Date: 4/23/2023  PROCEDURE: XR CHEST 1 VW  COMPARISON: None.  INDICATIONS: UNSPECIFIED WEAKNESS; DIZZINESS.  FINDINGS: A single frontal (AP or PA upright portable) chest radiograph reveals no cardiac enlargement and no acute infiltrate. No pneumothorax is seen.  External artifacts obscure detail.  Chronic calcified granulomatous disease involves the chest.  Degenerative changes involve the imaged spine and bilateral shoulders.  There may be mild levoscoliosis of the lower thoracic spine.  Generalized osteopenia is suggested.       No acute cardiopulmonary disease is seen radiographically.    Please note that portions of this note were completed with a voice recognition program.  MAMIE BERNABE JR, MD       Electronically Signed and Approved By: MAMIE BERNABE JR, MD on 4/23/2023 at 20:02                  Differential Diagnosis and Discussion:      Headache: Differential diagnosis includes but is not limited to migraine, cluster headache, hypertension, tumor, subarachnoid bleeding, pseudotumor cerebri, temporal arteritis, infections, tension headache, and TMJ syndrome.    All labs were reviewed  and interpreted by me.  All X-rays impressions were independently interpreted by me.  EKG was interpreted by me.  CT scan radiology impression was interpreted by me.    MDM  Number of Diagnoses or Management Options  Diagnosis management comments: Patient presented to the emergency department with a headache.  She said persistent headache for about a week.  At time of evaluation headache had resolved.  She has some tingling to her left side of face.  This was mostly located over maxillary region.  She also reports some sinus pressure.  Especially over maxillary sinuses.  She has no focal neurological deficits on exam.  She had dizziness earlier that resolved.  Labs were obtained and showed no significant abnormality.  CT was obtained that showed no acute finding other than moderate-severe age-indeterminate sinus disease.  Presentation appears more consistent with sinus pressure causing headaches.  Discuss getting CTA for further evaluation. At this time patient prefer treating sinusitis since not symptoms at this time. Will treat for possible sinusitis.  Recommend taking allergy medication daily.  Also recommended trying Flonase.  Patient able to ambulate while in the emergency department.  Recommend close follow-up with her primary physician.  Discussed return precautions, discharge instructions and answered all her questions.       Amount and/or Complexity of Data Reviewed  Clinical lab tests: reviewed  Tests in the radiology section of CPT®: reviewed    Risk of Complications, Morbidity, and/or Mortality  Presenting problems: moderate  Management options: moderate    Patient Progress  Patient progress: stable           Patient Care Considerations:    CTA for further evaluation of headache but headache has since resolved and she has no focal neurological deficits and patient also does not want any additional imaging at this time.      Consultants/Shared Management Plan:    None    Social Determinants of  Health:    Patient is independent, reliable, and has access to care.       Disposition and Care Coordination:    Discharged: I considered escalation of care by admitting this patient for observation, however the patient has improved and is suitable and  stable for discharge.    I have explained the patient´s condition, diagnoses and treatment plan based on the information available to me at this time. I have answered questions and addressed any concerns. The patient has a good  understanding of the patient´s diagnosis, condition, and treatment plan as can be expected at this point. The vital signs have been stable. The patient´s condition is stable and appropriate for discharge from the emergency department.      The patient will pursue further outpatient evaluation with the primary care physician or other designated or consulting physician as outlined in the discharge instructions. They are agreeable to this plan of care and follow-up instructions have been explained in detail. The patient has received these instructions in written format and have expressed an understanding of the discharge instructions. The patient is aware that any significant change in condition or worsening of symptoms should prompt an immediate return to this or the closest emergency department or call to 911.  I have explained discharge medications and the need for follow up with the patient/caretakers. This was also printed in the discharge instructions. Patient was discharged with the following medications and follow up:      Medication List      New Prescriptions    amoxicillin-clavulanate 875-125 MG per tablet  Commonly known as: AUGMENTIN  Take 1 tablet by mouth 2 (Two) Times a Day for 7 days.     cetirizine 10 MG tablet  Commonly known as: zyrTEC  Take 1 tablet by mouth Daily.     fluticasone 50 MCG/ACT nasal spray  Commonly known as: FLONASE  2 sprays into the nostril(s) as directed by provider Daily.        Stop    Eliquis 2.5 MG  tablet tablet  Generic drug: apixaban           Where to Get Your Medications      These medications were sent to [facility] [address] [place] [address] - [address] [place] [address] - [phone]  - [phone] [address] [address]    Phone: [phone]   · amoxicillin-clavulanate 875-125 MG per tablet  · cetirizine 10 MG tablet  · fluticasone 50 MCG/ACT nasal spray      Juliana Dial, APRN  [address] [facility] [address]  [phone]    In 2 days         Final diagnoses:   Nonintractable headache, unspecified chronicity pattern, unspecified headache type   Maxillary sinusitis, unspecified chronicity        ED Disposition     ED Disposition   Discharge    Condition   Stable    Comment   --             This medical record created using voice recognition software.    Documentation assistance provided by Leodan Jaime acting as scribe for Michelle Argueta MD. Information recorded by the scribe was done at my direction and has been verified and validated by me.         Leodan Jaime  04/23/23 2536       Michelle Argueta MD  04/23/23 7526

## 2023-04-24 NOTE — ED NOTES
Patient says MD told her she could put 25% weight on left leg. Walked to bathroom, and back. C/o nausea. Zofran ordered by Dr. Argueta and given.

## 2023-04-26 ENCOUNTER — OFFICE VISIT (OUTPATIENT)
Dept: ORTHOPEDIC SURGERY | Facility: CLINIC | Age: 68
End: 2023-04-26
Payer: MEDICARE

## 2023-04-26 VITALS — OXYGEN SATURATION: 95 % | BODY MASS INDEX: 24.27 KG/M2 | HEART RATE: 78 BPM | WEIGHT: 137 LBS | HEIGHT: 63 IN

## 2023-04-26 DIAGNOSIS — S72.002D: Primary | ICD-10-CM

## 2023-04-26 PROCEDURE — 99024 POSTOP FOLLOW-UP VISIT: CPT

## 2023-04-26 RX ORDER — ONDANSETRON 4 MG/1
4 TABLET, ORALLY DISINTEGRATING ORAL EVERY 8 HOURS PRN
Qty: 90 TABLET | Refills: 1 | Status: SHIPPED | OUTPATIENT
Start: 2023-04-26

## 2023-04-26 RX ORDER — MAGNESIUM OXIDE 400 MG/1
TABLET ORAL
COMMUNITY

## 2023-04-26 RX ORDER — TRAMADOL HYDROCHLORIDE 50 MG/1
TABLET ORAL
COMMUNITY
Start: 2023-04-15 | End: 2023-04-26 | Stop reason: SDUPTHER

## 2023-04-26 RX ORDER — SUMATRIPTAN 100 MG/1
TABLET, FILM COATED ORAL
COMMUNITY

## 2023-04-26 RX ORDER — DOXAZOSIN MESYLATE 1 MG/1
TABLET ORAL
COMMUNITY

## 2023-04-26 RX ORDER — LAMOTRIGINE 25 MG/1
TABLET ORAL
COMMUNITY

## 2023-04-26 NOTE — TELEPHONE ENCOUNTER
Patient seen in office today; requesting refill for pain medication.  [facility] pharmacy, [place].

## 2023-04-26 NOTE — PATIENT INSTRUCTIONS
X-rays taken and reviewed, showing intact hardware.    Staples removed in office and Steri-Strips applied.  Patient educated on incision care.  Please keep incision clean and dry.  Do not soak or submerge in water until incision is fully healed.  Do not apply creams or lotions over the incision.  Please allow Steri-Strips to fall off on their own within 7 to 10 days.    Continue icing as needed to help with pain and swelling.  Ice knee up to 3 or 4 times daily for no longer than 15 to 20 minutes at a time.    Continue PT to progress ROM, strength, and weightbearing status.  Continue using walker until physical therapy feels that it is okay to discontinue this.  Refill for tramadol and Zofran sent to pharmacy.  Order for physical therapy outpatient.  Follow-up in 4 weeks. Repeat x-rays are needed at this visit.  Please call with questions or concerns.

## 2023-04-26 NOTE — PROGRESS NOTES
"Chief Complaint  Follow-up and Pain of the Left Hip and Suture / Staple Removal    Subjective      Kimberly Landers presents to [facility] for follow-up of a closed femoral neck fracture that occurred on 4/12/2023 that was repaired operatively on 4/13/2023 with hip cannulated screw placement by Dr. Nunez.  Patient presents today ambulatory with a walker.  Reports that she is doing well with minimal pain.  States that her tramadol does make her sick to her stomach and is wanting a prescription for Zofran as well.  Physical therapy has been coming into the home.  States that she has been working on walking and they have added some resistance.  Patient states the majority of her pain is coming from her mid thigh.    Objective   Allergies   Allergen Reactions   • Alprazolam Shortness Of Breath   • Propoxyphene Nausea And Vomiting     Hot flashes and sweaty     • Codeine Nausea And Vomiting   • Latex Rash       Vital Signs:   Pulse 78   Ht 160 cm (63\")   Wt 62.1 kg (137 lb)   SpO2 95%   BMI 24.27 kg/m²       Physical Exam    Constitutional: Awake, alert. Well nourished appearance.    Integumentary: Warm, dry, intact. No obvious rashes.    HENT: Atraumatic, normocephalic.   Respiratory: Non labored respirations .   Cardiovascular: Intact peripheral pulses.    Psychiatric: Normal mood and affect. A&O X3    Ortho Exam  Surgical incision is healing appropriately, no redness, drainage, tenderness to the touch.  Steri-Strips were applied today.  4 out of 5 strength noted to hip flexion, extension, abduction, adduction.  No pain is elicited with internal and external rotation.    Imaging Results (Most Recent)     Procedure Component Value Units Date/Time    XR Hip With or Without Pelvis 2 - 3 View Left [[id number]] Resulted: 04/26/23 1531     Updated: 04/26/23 1531    Narrative:      X-Ray Report:  Study: X-rays ordered, taken in the office, and reviewed today.   Site: Left hip and pelvis " xray  Indication: Left hip fracture  View: AP/Lateral view(s)  Findings: Left hip fracture with routine healing, good alignment, all   hardware is intact. No evidence of hardware malfunction.   Prior studies available for comparison: yes                       Assessment and Plan   Problem List Items Addressed This Visit    None  Visit Diagnoses     Closed fracture of left hip requiring operative repair, with routine healing, subsequent encounter    -  Primary    Relevant Orders    XR Hip With or Without Pelvis 2 - 3 View Left (Completed)    Ambulatory Referral to Physical Therapy Evaluate and treat, POST OP; Stretching, ROM, Strengthening          Follow Up   Return in about 4 weeks (around 5/24/2023).    Patient is a non-smoker, did not discuss options for smoking cessation.     Social History     Socioeconomic History   • Marital status: Single   Tobacco Use   • Smoking status: Never     Passive exposure: Yes   • Smokeless tobacco: Never   Vaping Use   • Vaping Use: Never used   Substance and Sexual Activity   • Alcohol use: Never   • Drug use: Never   • Sexual activity: Defer       Patient Instructions   X-rays taken and reviewed, showing intact hardware.    Staples removed in office and Steri-Strips applied.  Patient educated on incision care.  Please keep incision clean and dry.  Do not soak or submerge in water until incision is fully healed.  Do not apply creams or lotions over the incision.  Please allow Steri-Strips to fall off on their own within 7 to 10 days.    Continue icing as needed to help with pain and swelling.  Ice knee up to 3 or 4 times daily for no longer than 15 to 20 minutes at a time.    Continue PT to progress ROM, strength, and weightbearing status.  Continue using walker until physical therapy feels that it is okay to discontinue this.  Refill for tramadol and Zofran sent to pharmacy.  Order for physical therapy outpatient.  Follow-up in 4 weeks. Repeat x-rays are needed at this visit.   Please call with questions or concerns.      Patient was given instructions and counseling regarding her condition or for health maintenance advice. Please see specific information pulled into the AVS if appropriate.

## 2023-04-27 ENCOUNTER — TELEPHONE (OUTPATIENT)
Dept: ORTHOPEDIC SURGERY | Facility: CLINIC | Age: 68
End: 2023-04-27

## 2023-04-27 RX ORDER — TRAMADOL HYDROCHLORIDE 50 MG/1
50 TABLET ORAL EVERY 6 HOURS PRN
Qty: 28 TABLET | Refills: 0 | Status: SHIPPED | OUTPATIENT
Start: 2023-04-27

## 2023-04-27 NOTE — TELEPHONE ENCOUNTER
Caller: Kimberly Landers    Relationship to patient: Self    Best call back number:     Patient is needing: UNABLE TO WARM TRANSFER.  PATIENT DIDN'T GET PRESCRIPTION SENT FOR TRAMADOL LIKE DISCUSSED.  PLEASE CONTACT HER WHEN SHE IS OK TO GO GET HER PRESCRIPTION

## 2023-05-15 LAB — QT INTERVAL: 413 MS

## 2023-05-26 ENCOUNTER — OFFICE VISIT (OUTPATIENT)
Dept: ORTHOPEDIC SURGERY | Facility: CLINIC | Age: 68
End: 2023-05-26

## 2023-05-26 VITALS — HEART RATE: 59 BPM | WEIGHT: 137 LBS | OXYGEN SATURATION: 97 % | HEIGHT: 63 IN | BODY MASS INDEX: 24.27 KG/M2

## 2023-05-26 DIAGNOSIS — S72.002D CLOSED FRACTURE OF LEFT HIP WITH ROUTINE HEALING, SUBSEQUENT ENCOUNTER: ICD-10-CM

## 2023-05-26 DIAGNOSIS — S72.002A CLOSED FRACTURE OF LEFT HIP, INITIAL ENCOUNTER: Primary | ICD-10-CM

## 2023-05-26 PROCEDURE — 99024 POSTOP FOLLOW-UP VISIT: CPT

## 2023-05-26 PROCEDURE — 1160F RVW MEDS BY RX/DR IN RCRD: CPT

## 2023-05-26 PROCEDURE — 1159F MED LIST DOCD IN RCRD: CPT

## 2023-05-26 RX ORDER — LIDOCAINE 50 MG/G
1 PATCH TOPICAL DAILY
Qty: 30 PATCH | Refills: 0 | Status: SHIPPED | OUTPATIENT
Start: 2023-05-26

## 2023-05-26 RX ORDER — VALSARTAN 80 MG/1
TABLET ORAL
COMMUNITY
Start: 2023-05-25

## 2023-05-26 RX ORDER — DOXAZOSIN MESYLATE 4 MG/1
4 TABLET ORAL NIGHTLY
COMMUNITY
Start: 2023-05-23

## 2023-05-30 NOTE — PROGRESS NOTES
"Chief Complaint  Follow-up and Pain of the Left Hip    Subjective      Kimberly Landers presents to [facility] for follow-up of hip cannulated screw placement with Dr. Nunez on 4/13/2023.  Patient presents today ambulatory with a cane.  She has been taking Tylenol for pain which is controlling her pain adequately.  She is attending physical therapy with [facility] is working on stretching and resistance.    Objective   Allergies   Allergen Reactions   • Alprazolam Shortness Of Breath   • Propoxyphene Nausea And Vomiting     Hot flashes and sweaty     • Codeine Nausea And Vomiting   • Latex Rash       Vital Signs:   Pulse 59   Ht 160 cm (63\")   Wt 62.1 kg (137 lb)   SpO2 97%   BMI 24.27 kg/m²       Physical Exam    Constitutional: Awake, alert. Well nourished appearance.    Integumentary: Warm, dry, intact. No obvious rashes.    HENT: Atraumatic, normocephalic.   Respiratory: Non labored respirations .   Cardiovascular: Intact peripheral pulses.    Psychiatric: Normal mood and affect. A&O X3    Ortho Exam  Left hip: Incisions are well approximated and healing appropriately.  There is no redness, drainage or tenderness to touch.  4/5 strength to hip flexion, extension, abduction and adduction.  Sensation is intact.  Peripheral pulses are intact.    Imaging Results (Most Recent)     Procedure Component Value Units Date/Time    XR Hip With or Without Pelvis 2 - 3 View Left [[id number]] Resulted: 05/26/23 1612     Updated: 05/26/23 1612    Narrative:      X-Ray Report:  Study: X-rays ordered, taken in the office, and reviewed today.   Site: Left hip with or without pelvis xray  Indication: Left hip trochanteric nailing  View: AP/Lateral view(s)  Findings: Fracture with stable alignment and screws in place. No evidence   of hardware malfunction.   Prior studies available for comparison: yes                       Assessment and Plan   Problem List Items Addressed This Visit        Musculoskeletal " and Injuries    Closed left hip fracture - Primary    Relevant Orders    XR Hip With or Without Pelvis 2 - 3 View Left (Completed)    Ambulatory Referral to Physical Therapy Evaluate and treat, POST OP; Stretching, ROM, Strengthening (Completed)       Follow Up   Return in about 4 weeks (around 6/23/2023).    Patient is a non-smoker, did not discuss options for smoking cessation.     Social History     Socioeconomic History   • Marital status: Single   Tobacco Use   • Smoking status: Never     Passive exposure: Yes   • Smokeless tobacco: Never   Vaping Use   • Vaping Use: Never used   Substance and Sexual Activity   • Alcohol use: Never   • Drug use: Never   • Sexual activity: Defer       Patient Instructions   Patient is doing very well. Advised to continue PT to completion to progress strength and ROM. Continue home exercises.     Continue icing hip as needed up to 4 times daily for no longer than 15-20 mins at a time.     Follow-up in 4 weeks. Repeat x-rays needed. Call with changes or concerns.      Patient was given instructions and counseling regarding her condition or for health maintenance advice. Please see specific information pulled into the AVS if appropriate.

## 2023-05-30 NOTE — PATIENT INSTRUCTIONS
Patient is doing very well. Advised to continue PT to completion to progress strength and ROM. Continue home exercises.     Continue icing hip as needed up to 4 times daily for no longer than 15-20 mins at a time.     Follow-up in 4 weeks. Repeat x-rays needed. Call with changes or concerns.

## 2023-08-07 RX ORDER — LIDOCAINE 50 MG/G
PATCH TOPICAL
Qty: 30 PATCH | Refills: 0 | Status: SHIPPED | OUTPATIENT
Start: 2023-08-07

## 2023-08-09 ENCOUNTER — OFFICE VISIT (OUTPATIENT)
Dept: ORTHOPEDIC SURGERY | Facility: CLINIC | Age: 68
End: 2023-08-09
Payer: MEDICARE

## 2023-08-09 VITALS
OXYGEN SATURATION: 97 % | DIASTOLIC BLOOD PRESSURE: 86 MMHG | SYSTOLIC BLOOD PRESSURE: 157 MMHG | HEART RATE: 60 BPM | HEIGHT: 63 IN | WEIGHT: 137 LBS | BODY MASS INDEX: 24.27 KG/M2

## 2023-08-09 DIAGNOSIS — M25.552 LEFT HIP PAIN: Primary | ICD-10-CM

## 2023-08-09 DIAGNOSIS — Z47.89 AFTERCARE FOLLOWING SURGERY OF THE MUSCULOSKELETAL SYSTEM: ICD-10-CM

## 2023-08-09 RX ORDER — MELOXICAM 15 MG/1
15 TABLET ORAL DAILY
Qty: 30 TABLET | Refills: 3 | Status: SHIPPED | OUTPATIENT
Start: 2023-08-09

## 2023-08-10 NOTE — PROGRESS NOTES
"Chief Complaint  Follow-up and Pain of the Left Hip    Subjective      Kimberly Landers presents to [facility] for follow-up of left hip cannulated screw placement with Dr. Nunez on 4/13/2023.  Patient is currently 3 months and 26 days postop.  She has completed physical therapy.  Reports that her incision and lateral hip is  and feels that she is sore in the muscle.  Otherwise she is doing well.    Objective   Allergies   Allergen Reactions    Alprazolam Shortness Of Breath    Propoxyphene Nausea And Vomiting     Hot flashes and sweaty      Codeine Nausea And Vomiting    Latex Rash       Vital Signs:   /86   Pulse 60   Ht 160 cm (63\")   Wt 62.1 kg (137 lb)   SpO2 97%   BMI 24.27 kg/mý       Physical Exam    Constitutional: Awake, alert. Well nourished appearance.    Integumentary: Warm, dry, intact. No obvious rashes.    HENT: Atraumatic, normocephalic.   Respiratory: Non labored respirations .   Cardiovascular: Intact peripheral pulses.    Psychiatric: Normal mood and affect. A&O X3    Ortho Exam  Left hip: Incision is completely healed and well-approximated.  There is no redness, drainage or tenderness to the touch.  3/5 strength to hip flexion, extension, abduction and adduction.  No pain elicited with internal and external rotation of the hip.  Sensation is intact throughout.    Imaging Results (Most Recent)       Procedure Component Value Units Date/Time    XR Hip With or Without Pelvis 2 - 3 View Left [[id number]] Resulted: 08/09/23 1330     Updated: 08/09/23 1330    Narrative:      X-Ray Report:  Study: X-rays ordered, taken in the office, and reviewed today.   Site: Left hip xray  Indication: Left hip ORIF  View: AP/Lateral view(s)  Findings: Intact trochanteric nailing.  3 screws are in place with no   evidence of loosening.  Fracture is near completely healed.  No evidence   of hardware malfunction.   Prior studies available for comparison: yes          "              Assessment and Plan   Problem List Items Addressed This Visit    None  Visit Diagnoses       Left hip pain    -  Primary    Relevant Orders    XR Hip With or Without Pelvis 2 - 3 View Left (Completed)    Aftercare following left hip cannulated screw placement                Follow Up   Return in about 2 months (around 10/9/2023).    Patient is a non-smoker, did not discuss options for smoking cessation.     Social History     Socioeconomic History    Marital status: Single   Tobacco Use    Smoking status: Never     Passive exposure: Yes    Smokeless tobacco: Never   Vaping Use    Vaping Use: Never used   Substance and Sexual Activity    Alcohol use: Never    Drug use: Never    Sexual activity: Defer       Patient Instructions   X-rays taken and reviewed with patient.    Discussed plan of care with patient.  Prescribed meloxicam to help with the pain she is having.    Discussed continuing physical therapy, however patient declines.  She is continuing exercises at home.    Follow-up in 2 months to evaluate the muscle pain.  X-rays needed.  Patient may be discharged as follows going well.  Call for questions, concerns or worsening symptoms.  Patient was given instructions and counseling regarding her condition or for health maintenance advice. Please see specific information pulled into the AVS if appropriate.

## 2023-08-10 NOTE — PATIENT INSTRUCTIONS
X-rays taken and reviewed with patient.    Discussed plan of care with patient.  Prescribed meloxicam to help with the pain she is having.    Discussed continuing physical therapy, however patient declines.  She is continuing exercises at home.    Follow-up in 2 months to evaluate the muscle pain.  X-rays needed.  Patient may be discharged as follows going well.  Call for questions, concerns or worsening symptoms.

## 2023-09-18 RX ORDER — LIDOCAINE 50 MG/G
PATCH TOPICAL
Qty: 30 PATCH | Refills: 0 | Status: SHIPPED | OUTPATIENT
Start: 2023-09-18

## 2023-12-15 RX ORDER — MELOXICAM 15 MG/1
15 TABLET ORAL DAILY
Qty: 30 TABLET | Refills: 0 | Status: SHIPPED | OUTPATIENT
Start: 2023-12-15

## 2024-01-22 ENCOUNTER — OFFICE VISIT (OUTPATIENT)
Dept: ORTHOPEDIC SURGERY | Facility: CLINIC | Age: 69
End: 2024-01-22
Payer: MEDICARE

## 2024-01-22 VITALS
DIASTOLIC BLOOD PRESSURE: 83 MMHG | SYSTOLIC BLOOD PRESSURE: 163 MMHG | WEIGHT: 140 LBS | BODY MASS INDEX: 24.8 KG/M2 | HEART RATE: 66 BPM | OXYGEN SATURATION: 97 % | HEIGHT: 63 IN

## 2024-01-22 DIAGNOSIS — M77.8 RIGHT SHOULDER TENDONITIS: ICD-10-CM

## 2024-01-22 DIAGNOSIS — M25.511 RIGHT SHOULDER PAIN, UNSPECIFIED CHRONICITY: Primary | ICD-10-CM

## 2024-01-22 DIAGNOSIS — S46.002D INJURY OF LEFT ROTATOR CUFF, SUBSEQUENT ENCOUNTER: ICD-10-CM

## 2024-01-22 DIAGNOSIS — M25.512 LEFT SHOULDER PAIN, UNSPECIFIED CHRONICITY: ICD-10-CM

## 2024-01-22 PROCEDURE — 1160F RVW MEDS BY RX/DR IN RCRD: CPT | Performed by: STUDENT IN AN ORGANIZED HEALTH CARE EDUCATION/TRAINING PROGRAM

## 2024-01-22 PROCEDURE — 99213 OFFICE O/P EST LOW 20 MIN: CPT | Performed by: STUDENT IN AN ORGANIZED HEALTH CARE EDUCATION/TRAINING PROGRAM

## 2024-01-22 PROCEDURE — 1159F MED LIST DOCD IN RCRD: CPT | Performed by: STUDENT IN AN ORGANIZED HEALTH CARE EDUCATION/TRAINING PROGRAM

## 2024-01-22 RX ORDER — HYDRALAZINE HYDROCHLORIDE 25 MG/1
25 TABLET, FILM COATED ORAL
COMMUNITY
Start: 2024-01-08

## 2024-01-22 RX ORDER — DIAZEPAM 5 MG/1
5 TABLET ORAL ONCE
Qty: 1 TABLET | Refills: 0 | Status: SHIPPED | OUTPATIENT
Start: 2024-01-22 | End: 2024-01-22

## 2024-01-22 NOTE — PROGRESS NOTES
"Chief Complaint  Pain and Initial Evaluation of the Left Shoulder and Pain and Initial Evaluation of the Right Shoulder    Subjective          Kimberly Landers presents to [facility] for   History of Present Illness    The patient presents here today for evaluation of the bilateral shoulder. The patient reports bilateral shoulder pain for awhile, worsening in the last month. She reports she fell last year on her left side. She locates pain to the lateral shoulder. She has pain with overhead activity. She has been getting injections in her neck by the pain center.     Allergies   Allergen Reactions    Alprazolam Shortness Of Breath    Propoxyphene Nausea And Vomiting     Hot flashes and sweaty      Codeine Nausea And Vomiting    Hydrocodone Itching, Nausea Only and Nausea And Vomiting    Latex Rash        Social History     Socioeconomic History    Marital status: Single   Tobacco Use    Smoking status: Never     Passive exposure: Yes    Smokeless tobacco: Never   Vaping Use    Vaping Use: Never used   Substance and Sexual Activity    Alcohol use: Never    Drug use: Never    Sexual activity: Defer        I reviewed the patient's chief complaint, history of present illness, review of systems, past medical history, surgical history, family history, social history, medications, and allergy list.     REVIEW OF SYSTEMS    Constitutional: Denies fevers, chills, weight loss  Cardiovascular: Denies chest pain, shortness of breath  Skin: Denies rashes, acute skin changes  Neurologic: Denies headache, loss of consciousness  MSK: bilateral shoulder pain      Objective   Vital Signs:   /83   Pulse 66   Ht 160 cm (63\")   Wt 63.5 kg (140 lb)   SpO2 97%   BMI 24.80 kg/m²     Body mass index is 24.8 kg/m².    Physical Exam    General: Alert. No acute distress.   Right shoulder- non-tender to the biceps. Tender to the lateral shoulder. Forward elevation 120. Passive 180. External Rotation 60. " 5/5 rotator cuff strength. Negative impingement signs. Negative speeds.     Left shoulder- External Rotation 45. Internal rotation to lower lumbar spine. non-tender to the biceps. Tender to the lateral shoulder. Forward elevation 120. Passive 180. 4/5 supraspinatus strength. 4/5 infraspinatus and 5/5 subscapularis. Positive impingement. Negative speeds.     Procedures    Imaging Results (Most Recent)       Procedure Component Value Units Date/Time    XR Shoulder 2+ View Left [[id number]] Resulted: 01/22/24 1015     Updated: 01/22/24 1016    Narrative:      Indications: Left shoulder pain    Views: AP, Grashey, Scap Y, axillary left shoulder    Findings: No fractures noted.  Moderate AC joint arthrosis.  Glenohumeral   joint reduced.    Comparative Data: No comparative data available    XR Shoulder 2+ View Right [[id number]] Resulted: 01/22/24 1015     Updated: 01/22/24 1015    Narrative:      Indications: Right shoulder pain    Views: AP, Grashey, Scap Y, axillary right shoulder    Findings: No fractures noted.  Mild to moderate AC joint arthrosis.    Glenohumeral joint reduced.  Calcification along the anterior aspect of   the rotator cuff insertion.    Comparative Data: Comparative data found and reviewed today                     Assessment and Plan        XR Shoulder 2+ View Left    Result Date: 1/22/2024  Narrative: Indications: Left shoulder pain Views: AP, Grashey, Scap Y, axillary left shoulder Findings: No fractures noted.  Moderate AC joint arthrosis.  Glenohumeral joint reduced. Comparative Data: No comparative data available    XR Shoulder 2+ View Right    Result Date: 1/22/2024  Narrative: Indications: Right shoulder pain Views: AP, Grashey, Scap Y, axillary right shoulder Findings: No fractures noted.  Mild to moderate AC joint arthrosis.  Glenohumeral joint reduced.  Calcification along the anterior aspect of the rotator cuff insertion. Comparative Data: Comparative data found and reviewed today    CT  Head Without Contrast    Result Date: 1/11/2024  Narrative: REPORT-ID:[id number] STUDY:  CT BRAIN WITHOUT CONTRAST REASON FOR EXAM:   Female, 68 years old.  Headache, new or worsening (Age  and gt;= 50y) RADIATION DOSAGE (If Supplied By Facility):  CTDIvol = ( 51.7 ) mGy, DLP = ( 980.2 ) mGycm TECHNIQUE:   Transaxial CT imaging of the brain was performed without administration of intravenous contrast material. Individualized dose optimization techniques were used for this CT. COMPARISON:   4/12/2023 ___________________________________ FINDINGS: Normal soft tissue structures.  Normal calvarium. Normal size ventricles and extra-axial spaces for the patient''s age.  Normal white matter tracts of the cerebral hemispheres.  Normal basal ganglia and thalami.  Normal brainstem.  Normal cerebellum. There is no intracranial hemorrhage.  There are no findings of an acute ischemic infarction. Normal visualized paranasal sinuses. ___________________________________    Impression: Normal unenhanced CT scan of the brain. Electronically Signed: Fransisco Xiong MD 2024/01/11 at 11:21 CST Reading Location ID and State: [address] / [place] Tel [phone], Service support  [phone], Fax [phone]    XR Chest 1 View    Result Date: 1/11/2024  Narrative: REPORT-ID:[id number] STUDY:   X-RAY CHEST REASON FOR EXAM:   Female, 68 years old.  HYPERTENSION; 184/86 IN TRIAGE; NUMBNESS; FACIAL; NAUSEA TECHNIQUE:   Single AP portable view of the chest. COMPARISON:   4/12/2023 ___________________________________ FINDINGS: The lungs are clear and expanded.  There is no demonstrated pleural abnormality. Normal size heart.   Normal mediastinum and dede.  Normal visualized pulmonary arteries.  Normal visualized aortic arch and descending thoracic aorta. Normal visualized thoracic spine.  Normal visualized ribs, clavicles, and shoulders. There is no demonstrated abnormality of the visualized soft tissue  structures of the upper abdomen. ___________________________________    Impression: Normal x-ray examination of the chest. Electronically Signed: Fransisco Xiong MD 2024/01/11 at 8:50 CST Reading Location ID and State: [address] / [place] Tel [phone], Service support  [phone], Fax [phone]      Diagnoses and all orders for this visit:    1. Right shoulder pain, unspecified chronicity (Primary)  -     XR Shoulder 2+ View Right  -     diclofenac (VOLTAREN) 50 MG EC tablet; Take 1 tablet by mouth 2 (Two) Times a Day.  Dispense: 60 tablet; Refill: 1    2. Left shoulder pain, unspecified chronicity  -     XR Shoulder 2+ View Left  -     diclofenac (VOLTAREN) 50 MG EC tablet; Take 1 tablet by mouth 2 (Two) Times a Day.  Dispense: 60 tablet; Refill: 1    3. Injury of left rotator cuff, subsequent encounter  -     MRI Shoulder Left Without Contrast; Future  -     diclofenac (VOLTAREN) 50 MG EC tablet; Take 1 tablet by mouth 2 (Two) Times a Day.  Dispense: 60 tablet; Refill: 1    4. Right shoulder tendonitis  -     diclofenac (VOLTAREN) 50 MG EC tablet; Take 1 tablet by mouth 2 (Two) Times a Day.  Dispense: 60 tablet; Refill: 1        Discussed the treatment plan with the patient.  I reviewed the x-rays that were obtained today with the patient. Plan for conservative treatment at this time. Plan for MRI of the left shoulder to evaluate the rotator cuff. I advised her some of her pain may be neck related. Prescription for diclofenac given today. I advised her to not take other NSAIDS.         Call or return if worsening symptoms.    Scribed for Steve Redmond MD by Radha Castillo  01/22/2024   09:49 EST         Follow Up       MRI results    Patient was given instructions and counseling regarding her condition or for health maintenance advice. Please see specific information pulled into the AVS if appropriate.       I have personally performed the services described in this document as scribed by the above  individual and it is both accurate and complete.     Steve Redmond MD  01/22/24  10:17 EST

## 2024-02-12 ENCOUNTER — OFFICE VISIT (OUTPATIENT)
Dept: ORTHOPEDIC SURGERY | Facility: CLINIC | Age: 69
End: 2024-02-12
Payer: MEDICARE

## 2024-02-12 VITALS
OXYGEN SATURATION: 93 % | BODY MASS INDEX: 24.8 KG/M2 | HEART RATE: 81 BPM | DIASTOLIC BLOOD PRESSURE: 84 MMHG | SYSTOLIC BLOOD PRESSURE: 135 MMHG | WEIGHT: 140 LBS | HEIGHT: 63 IN

## 2024-02-12 DIAGNOSIS — M25.552 LEFT HIP PAIN: Primary | ICD-10-CM

## 2024-02-12 DIAGNOSIS — Z47.89 AFTERCARE FOLLOWING SURGERY OF THE MUSCULOSKELETAL SYSTEM: ICD-10-CM

## 2024-02-12 PROCEDURE — 1159F MED LIST DOCD IN RCRD: CPT

## 2024-02-12 PROCEDURE — 1160F RVW MEDS BY RX/DR IN RCRD: CPT

## 2024-02-12 PROCEDURE — 99213 OFFICE O/P EST LOW 20 MIN: CPT

## 2024-02-12 RX ORDER — LIDOCAINE 50 MG/G
1 PATCH TOPICAL DAILY
Qty: 30 PATCH | Refills: 0 | Status: SHIPPED | OUTPATIENT
Start: 2024-02-12

## 2024-02-12 NOTE — PROGRESS NOTES
"Chief Complaint  Follow-up of the Left Hip    Subjective      Kimberly Landers presents to [facility] for follow-up of left hip cannulated screw placement with Dr. Nunez on 4/13/2023.  Patient is currently 9 months and 30 days postop.  She has completed physical therapy but does wish to return.  She is needing refill of lidocaine patches.  She inquires of medication she can take to help with her hip and knee pain.  She has taken diclofenac and Celebrex.    Objective   Allergies   Allergen Reactions    Alprazolam Shortness Of Breath    Propoxyphene Nausea And Vomiting     Hot flashes and sweaty      Codeine Nausea And Vomiting    Hydrocodone Itching, Nausea Only and Nausea And Vomiting    Amlodipine Swelling     Swelling to lower extremities.     Swelling to lower extremities.    Latex Rash       Vital Signs:   /84   Pulse 81   Ht 160 cm (63\")   Wt 63.5 kg (140 lb)   SpO2 93%   BMI 24.80 kg/m²       Physical Exam    Constitutional: Awake, alert. Well nourished appearance.    Integumentary: Warm, dry, intact. No obvious rashes.    HENT: Atraumatic, normocephalic.   Respiratory: Non labored respirations .   Cardiovascular: Intact peripheral pulses.    Psychiatric: Normal mood and affect. A&O X3    Ortho Exam  Left hip: 3/5 strength to hip flexors, extensors, abductors and adductor's.  No pain elicited in the groin with internal and external rotation of the hip.  Incision is completely healed well-approximated.  Neurovascular intact.  Sensation is intact.    Imaging Results (Most Recent)       Procedure Component Value Units Date/Time    XR Hip With or Without Pelvis 2 - 3 View Left [[id number]] Resulted: 02/13/24 1314     Updated: 02/13/24 1314    Narrative:      X-Ray Report:  Study: X-rays ordered, taken in the office, and reviewed today.   Site: Left hip xray  Indication: Left hip intertrochanteric nailing  View: AP/Lateral view(s)  Findings: Intact left hip daily. No evidence of " hardware malfunction.   Prior studies available for comparison: yes                       Assessment and Plan   Problem List Items Addressed This Visit    None  Visit Diagnoses       Left hip pain    -  Primary    Relevant Orders    XR Hip With or Without Pelvis 2 - 3 View Left (Completed)    Ambulatory Referral to Physical Therapy Evaluate and treat, POST OP; Stretching, ROM, Strengthening (Completed)    Aftercare following left hip cannulated screw placement        Relevant Orders    Ambulatory Referral to Physical Therapy Evaluate and treat, POST OP; Stretching, ROM, Strengthening (Completed)            Follow Up   Return in about 6 weeks (around 3/25/2024).    Patient is a non-smoker, did not discuss options for smoking cessation.     Social History     Socioeconomic History    Marital status: Single   Tobacco Use    Smoking status: Never     Passive exposure: Yes    Smokeless tobacco: Never   Vaping Use    Vaping Use: Never used   Substance and Sexual Activity    Alcohol use: Never    Drug use: Never    Sexual activity: Defer       Patient Instructions   X-rays taken and reviewed with patient.    Order placed for patient to resume physical therapy.    Discussed taking turmeric, Tylenol arthritis and continuing with anti-inflammatory.  Voltaren gel over-the-counter.  Lidocaine patches ordered today.    Follow-up in 6 weeks to evaluate progress with therapy and the need to continue.  No x-rays needed.  Call for questions, concerns or worsening symptoms.  Patient was given instructions and counseling regarding her condition or for health maintenance advice. Please see specific information pulled into the AVS if appropriate.

## 2024-02-20 NOTE — PATIENT INSTRUCTIONS
X-rays taken and reviewed with patient.    Order placed for patient to resume physical therapy.    Discussed taking turmeric, Tylenol arthritis and continuing with anti-inflammatory.  Voltaren gel over-the-counter.  Lidocaine patches ordered today.    Follow-up in 6 weeks to evaluate progress with therapy and the need to continue.  No x-rays needed.  Call for questions, concerns or worsening symptoms.

## 2024-02-29 ENCOUNTER — TELEPHONE (OUTPATIENT)
Dept: ORTHOPEDIC SURGERY | Facility: CLINIC | Age: 69
End: 2024-02-29
Payer: MEDICARE

## 2024-02-29 DIAGNOSIS — S46.002D INJURY OF LEFT ROTATOR CUFF, SUBSEQUENT ENCOUNTER: ICD-10-CM

## 2024-02-29 NOTE — TELEPHONE ENCOUNTER
LEFT VM TO SCHEDULE APPT. TO REVIEW LT SHOULDER MRI RESULTS    SCHEDULE W/SAL AT NEXT AVAILABLE    HUB OK TO RELAY AND SCHEDULE

## 2024-03-06 ENCOUNTER — OFFICE VISIT (OUTPATIENT)
Dept: ORTHOPEDIC SURGERY | Facility: CLINIC | Age: 69
End: 2024-03-06
Payer: MEDICARE

## 2024-03-06 VITALS — BODY MASS INDEX: 24.8 KG/M2 | WEIGHT: 140 LBS | HEIGHT: 63 IN

## 2024-03-06 DIAGNOSIS — M19.019 OSTEOARTHRITIS OF AC (ACROMIOCLAVICULAR) JOINT: ICD-10-CM

## 2024-03-06 DIAGNOSIS — M75.112 INCOMPLETE TEAR OF LEFT ROTATOR CUFF, UNSPECIFIED WHETHER TRAUMATIC: Primary | ICD-10-CM

## 2024-03-06 DIAGNOSIS — M75.82 ROTATOR CUFF TENDONITIS, LEFT: ICD-10-CM

## 2024-03-06 RX ORDER — CHLORTHALIDONE 25 MG/1
25 TABLET ORAL DAILY
COMMUNITY
Start: 2024-03-01 | End: 2024-05-30

## 2024-03-06 RX ORDER — LISINOPRIL 20 MG/1
1 TABLET ORAL 2 TIMES DAILY
COMMUNITY

## 2024-03-06 NOTE — PROGRESS NOTES
"Chief Complaint  Pain and Follow-up of the Left Shoulder and Pain and Follow-up of the Right Shoulder    Subjective          Kimberly Landers presents to [facility] for   History of Present Illness    The patient presents here today for follow up evaluation of the bilateral shoulders. The patient recently had an MRI of his left shoulder and is here today for those results. To review, The patient reports bilateral shoulder pain for awhile, worsening in the last month. She reports she fell last year on her left side. She locates pain to the lateral shoulder. She has pain with overhead activity. She has been getting injections in her neck by the pain center.      Allergies   Allergen Reactions    Alprazolam Shortness Of Breath    Propoxyphene Nausea And Vomiting     Hot flashes and sweaty      Codeine Nausea And Vomiting    Hydrocodone Itching, Nausea Only and Nausea And Vomiting    Amlodipine Swelling     Swelling to lower extremities.     Swelling to lower extremities.    Latex Rash        Social History     Socioeconomic History    Marital status: Single   Tobacco Use    Smoking status: Never     Passive exposure: Yes    Smokeless tobacco: Never   Vaping Use    Vaping status: Never Used   Substance and Sexual Activity    Alcohol use: Never    Drug use: Never    Sexual activity: Defer        I reviewed the patient's chief complaint, history of present illness, review of systems, past medical history, surgical history, family history, social history, medications, and allergy list.     REVIEW OF SYSTEMS    Constitutional: Denies fevers, chills, weight loss  Cardiovascular: Denies chest pain, shortness of breath  Skin: Denies rashes, acute skin changes  Neurologic: Denies headache, loss of consciousness  MSK: bilateral shoulder pain      Objective   Vital Signs:   Ht 160 cm (63\")   Wt 63.5 kg (140 lb)   BMI 24.80 kg/m²     Body mass index is 24.8 kg/m².    Physical Exam    General: Alert. " No acute distress.   Right shoulder- non-tender to the biceps. Tender to the lateral shoulder. Forward elevation 120. Passive 180. External Rotation 60. 5/5 rotator cuff strength. Negative impingement signs. Negative speeds.      Left shoulder- External Rotation 45. Internal rotation to lower lumbar spine. non-tender to the biceps. Tender to the lateral shoulder. Forward elevation 120. Passive 180. 4/5 supraspinatus strength. 4/5 infraspinatus and 5/5 subscapularis. Positive impingement. Negative speeds.     Procedures    Imaging Results (Most Recent)       None                     Assessment and Plan        MRI Shoulder Left Without Contrast    Result Date: 2/29/2024  Narrative: REPORT-ID:[id number] STUDY:   MRI LEFT SHOULDER REASON FOR EXAM:   Female, 68 years old.  Injury of tendon of left rotator cuff, fall, subsequent encounter, TECHNIQUE:   Standardized fat and water weighted pulse sequences were obtained in all 3 orthogonal planes. COMPARISON:   X-ray 12/19/2023 ___________________________________ FINDINGS: Moderate supraspinatus and infraspinatus tendinosis and peritendinitis with bursal sided fraying with a 5 x 5 mm contiguous helical interstitial delamination tear of the distal anterior supraspinatus tendon at the footprint. Normal subscapularis tendon.  Normal teres minor tendon. Normal supraspinatus muscle.  Normal infraspinatus muscle.  Normal subscapularis muscle.  Normal teres minor muscle. Normal glenohumeral articulation.  There is a cortical erosion at the insertion of the infraspinatus tendon.  Normal biceps labral complex.  Normal intracapsular long biceps tendon.  Normal labrum.  Normal capsulo- ligamentous complex.  Normal rotator interval. There is moderate osteoarthritis of the acromioclavicular articulations.  There is a Type II morphology (curved), with a anterior downsloping orientation. There is no subacromial-subdeltoid bursal fluid. Normal visualized coracohumeral and  coracoacromial ligaments.  Normal quadrilateral space.  Normal axillary space. Normal deltoid muscle.  Normal trapezius muscle. ___________________________________    Impression: 1.  Moderate supraspinatus and infraspinatus tendinosis and peritendinitis is bursal sided fraying and a 5 x 5 mm concealed interstitial delamination tear of the distal anterior supraspinatus tendon at the footprint. No muscular atrophy. 2.  Moderate acromioclavicular joint arthrosis with capsulitis and stress reaction and subchondral cyst formation of the distal clavicle and inferior osteophyte formation producing medial outlet stenosis. 3.  Anterolateral downsloping acromion produces lateral outlet stenosis. Electronically Signed: Fransisco Xiong MD 2024/02/29 at 8:45 CST Reading Location ID and State: [id number] / [place] Tel [phone], Service support  [phone], Fax [phone]    XR Hip With or Without Pelvis 2 - 3 View Left    Result Date: 2/13/2024  Narrative: X-Ray Report: Study: X-rays ordered, taken in the office, and reviewed today. Site: Left hip xray Indication: Left hip intertrochanteric nailing View: AP/Lateral view(s) Findings: Intact left hip daily. No evidence of hardware malfunction. Prior studies available for comparison: yes       Diagnoses and all orders for this visit:    1. Incomplete tear of left rotator cuff, unspecified whether traumatic (Primary)    2. Rotator cuff tendonitis, left    3. Osteoarthritis of AC (acromioclavicular) joint        Discussed the treatment options with the patient, operative vs non-operative.  I reviewed the MRI with the patient. She declined an injection today. Home exercises given today. Plan to continue voltaren gel and oral diclofenac. The patient expressed understanding and wished to proceed.       Call or return if worsening symptoms.    Scribed for Steve Redmond MD by Radha Castillo  03/06/2024   10:23 EST         Follow Up       6 weeks    Patient was given instructions  and counseling regarding her condition or for health maintenance advice. Please see specific information pulled into the AVS if appropriate.       I have personally performed the services described in this document as scribed by the above individual and it is both accurate and complete.     Steve Redmond MD  03/06/24  10:35 EST

## 2024-03-23 DIAGNOSIS — M25.511 RIGHT SHOULDER PAIN, UNSPECIFIED CHRONICITY: ICD-10-CM

## 2024-03-23 DIAGNOSIS — S46.002D INJURY OF LEFT ROTATOR CUFF, SUBSEQUENT ENCOUNTER: ICD-10-CM

## 2024-03-23 DIAGNOSIS — M77.8 RIGHT SHOULDER TENDONITIS: ICD-10-CM

## 2024-03-23 DIAGNOSIS — M25.512 LEFT SHOULDER PAIN, UNSPECIFIED CHRONICITY: ICD-10-CM

## 2024-07-03 ENCOUNTER — OFFICE VISIT (OUTPATIENT)
Dept: ORTHOPEDIC SURGERY | Facility: CLINIC | Age: 69
End: 2024-07-03
Payer: MEDICARE

## 2024-07-03 VITALS
SYSTOLIC BLOOD PRESSURE: 145 MMHG | OXYGEN SATURATION: 97 % | DIASTOLIC BLOOD PRESSURE: 69 MMHG | HEART RATE: 58 BPM | BODY MASS INDEX: 24.8 KG/M2 | WEIGHT: 139.99 LBS | HEIGHT: 63 IN

## 2024-07-03 DIAGNOSIS — M76.32 IT BAND SYNDROME, LEFT: ICD-10-CM

## 2024-07-03 DIAGNOSIS — M25.552 LEFT HIP PAIN: Primary | ICD-10-CM

## 2024-07-03 PROCEDURE — 1159F MED LIST DOCD IN RCRD: CPT

## 2024-07-03 PROCEDURE — 99214 OFFICE O/P EST MOD 30 MIN: CPT

## 2024-07-03 PROCEDURE — 1160F RVW MEDS BY RX/DR IN RCRD: CPT

## 2024-07-03 RX ORDER — METOPROLOL TARTRATE 50 MG/1
TABLET, FILM COATED ORAL
COMMUNITY
Start: 2024-06-26

## 2024-07-03 RX ORDER — PROPRANOLOL HCL 60 MG
1 CAPSULE, EXTENDED RELEASE 24HR ORAL 2 TIMES DAILY
COMMUNITY
Start: 2024-03-11

## 2024-07-03 RX ORDER — AZELASTINE HYDROCHLORIDE 137 UG/1
SPRAY, METERED NASAL
COMMUNITY
Start: 2024-06-18

## 2024-07-03 RX ORDER — CYANOCOBALAMIN 1000 UG/ML
INJECTION, SOLUTION INTRAMUSCULAR; SUBCUTANEOUS
COMMUNITY
Start: 2024-06-01

## 2024-07-03 RX ORDER — BUSPIRONE HYDROCHLORIDE 7.5 MG/1
TABLET ORAL
COMMUNITY
Start: 2024-06-03

## 2024-07-03 RX ORDER — FUROSEMIDE 40 MG/1
TABLET ORAL
COMMUNITY
Start: 2024-06-03

## 2024-07-03 RX ORDER — MELOXICAM 7.5 MG/1
7.5 TABLET ORAL DAILY
Qty: 30 TABLET | Refills: 0 | Status: SHIPPED | OUTPATIENT
Start: 2024-07-03

## 2024-07-05 NOTE — PROGRESS NOTES
"Chief Complaint  Pain and Follow-up of the Left Hip    Subjective      Kimberly Landers presents to [facility] for follow up of her left hip.  Patient is status post left hip cannulated screw placement performed Dr. Nunez on 4/13/2023.  Patient reports that she has finished physical therapy but she is still having pain within her hip with certain exercises.  She does report that it is difficult to lay on that side.  She does state that therapy did help in the past but she has not been with them for quite some time.  She states that the pain radiates all the way down to her knee on the lateral aspect of her thigh.  She denies taking any prescription pain medication.  She states that she will take Tylenol and Voltaren gel as needed..     Allergies   Allergen Reactions    Alprazolam Shortness Of Breath    Propoxyphene Nausea And Vomiting     Hot flashes and sweaty      Codeine Nausea And Vomiting    Hydrocodone Itching, Nausea Only and Nausea And Vomiting    Amlodipine Swelling     Swelling to lower extremities.     Swelling to lower extremities.    Latex Rash       Objective     Vital Signs:   Vitals:    07/03/24 0815   BP: 145/69   Pulse: 58   SpO2: 97%   Weight: 63.5 kg (139 lb 15.9 oz)   Height: 160 cm (63\")     Body mass index is 24.8 kg/m².    I reviewed the patient's chief complaint, history of present illness, review of systems, past medical history, surgical history, family history, social history, medications, and allergy list.     REVIEW OF SYSTEMS    Constitutional: Denies fevers, chills, weight loss  Cardiovascular: Denies chest pain, shortness of breath  Skin: Denies rashes, acute skin changes  Neurologic: Denies headache, loss of consciousness  MSK: Left hip pain.     Ortho Exam  Hip   General: Alert. No acute distress.  Gait: Nonantalgic gait.    Left hip: Well-healed incision.  No pain with passive hip ROM.  Intact active hip ROM with mild tenderness following the lateral " thigh down to the knee..  Mild palpable tenderness over the thigh or knee distally. Demonstrates intact active ankle dorsiflexion and plantarflexion. Demonstrates intact sensation over dorsal and plantar foot.  Calf soft, nontender. Neurovascular intact distally. Palpable pedal pulses.           Imaging Results (Most Recent)       None                Assessment and Plan   Diagnoses and all orders for this visit:    1. Left hip pain (Primary)  -     XR Hip With or Without Pelvis 2 - 3 View Left; Future  -     meloxicam (Mobic) 7.5 MG tablet; Take 1 tablet by mouth Daily.  Dispense: 30 tablet; Refill: 0  -     Ambulatory Referral to Physical Therapy    2. It band syndrome, left  -     meloxicam (Mobic) 7.5 MG tablet; Take 1 tablet by mouth Daily.  Dispense: 30 tablet; Refill: 0  -     Ambulatory Referral to Physical Therapy         Kimberly Landers presents to [facility] for her left hip.  Patient is status post left hip cannulated screw placement performed by Dr. Nunez on 4/13/2023.  Patient is continuing to have left hip pain that she reports is progressive and not improving.  We did discuss further conservative measures for her left hip pain which includes physical therapy/home exercise program, oral anti-inflammatories, topical anti-inflammatories and trial of the left hip intra-articular injection.  Patient deferred hip intra-articular injection at this time would like to proceed with physical therapy.  Physical therapy order was placed as well as a prescription for anti-inflammatory to take once daily.  Discussed NSAID use and appropriate medications.    Patient will follow-up in approximately 6 weeks.  Will reevaluate her hip after attending physical therapy.      Tobacco Use: Medium Risk (7/3/2024)    Patient History     Smoking Tobacco Use: Never     Smokeless Tobacco Use: Never     Passive Exposure: Yes     Patient reports that they are a nonsmoker; cessation education not  applicable.     BMI is within normal parameters. No other follow-up for BMI required.      Follow Up   No follow-ups on file.  There are no Patient Instructions on file for this visit.  Patient was given instructions and counseling regarding her condition or for health maintenance advice. Please see specific information pulled into the AVS if appropriate.       Dictated Utilizing Dragon Dictation. Please note that portions of this note were completed with a voice recognition program. Part of this note may be an electronic transcription/translation of spoken language to printed text using the Dragon Dictation System.

## (undated) DEVICE — MINOR-LF: Brand: MEDLINE INDUSTRIES, INC.

## (undated) DEVICE — SINGLE-USE BIOPSY FORCEPS: Brand: RADIAL JAW 4

## (undated) DEVICE — GAUZE,SPONGE,4"X4",16PLY,STRL,LF,10/TRAY: Brand: MEDLINE

## (undated) DEVICE — BANDAGE,GAUZE,BULKEE II,4.5"X4.1YD,STRL: Brand: MEDLINE

## (undated) DEVICE — STERILE POLYISOPRENE POWDER-FREE SURGICAL GLOVES: Brand: PROTEXIS

## (undated) DEVICE — DRSNG SURESITE WNDW 4X4.5

## (undated) DEVICE — SOL IRRG H2O PL/BG 1000ML STRL

## (undated) DEVICE — GLV SURG ULTRAFREE MAX LTX PF 8

## (undated) DEVICE — 3M™ STERI-DRAPE™ X-RAY IMAGE INTENSIFIER DRAPE, 10 PER CARTON / 4 CARTONS PER CASE, 1013: Brand: STERI-DRAPE™

## (undated) DEVICE — UNDYED BRAIDED (POLYGLACTIN 910), SYNTHETIC ABSORBABLE SUTURE: Brand: COATED VICRYL

## (undated) DEVICE — PROXIMATE RH ROTATING HEAD SKIN STAPLERS (35 WIDE) CONTAINS 35 STAINLESS STEEL STAPLES: Brand: PROXIMATE

## (undated) DEVICE — BLANKT WARM PACU MISTRAL/AIR PREM REFL A/ 85.8X50IN

## (undated) DEVICE — CVR LEG BOOTLEG F/R NOSKID 33IN

## (undated) DEVICE — EGD OR ERCP KIT: Brand: MEDLINE INDUSTRIES, INC.

## (undated) DEVICE — PAD GRND REM POLYHESIVE A/ DISP

## (undated) DEVICE — SUT MNCRYL PLS ANTIB UD 3/0 PS2 27IN

## (undated) DEVICE — BNDG ELAS MATRX V/CLS 6INX10YD LF

## (undated) DEVICE — Device: Brand: DEFENDO AIR/WATER/SUCTION AND BIOPSY VALVE

## (undated) DEVICE — GUIDEPIN FIX/EXT PT TROC TP 3.2MM 12IN

## (undated) DEVICE — ENCORE® LATEX ORTHO SIZE 8, STERILE LATEX POWDER-FREE SURGICAL GLOVE: Brand: ENCORE

## (undated) DEVICE — PENCL E/S SMOKEEVAC W/TELESCP CANN

## (undated) DEVICE — MAT FLR ABS W/BLU/LINER 56X72IN WHT

## (undated) DEVICE — INTENDED FOR TISSUE SEPARATION, AND OTHER PROCEDURES THAT REQUIRE A SHARP SURGICAL BLADE TO PUNCTURE OR CUT.: Brand: BARD-PARKER ® CARBON RIB-BACK BLADES

## (undated) DEVICE — TOWEL,OR,DSP,ST,BLUE,STD,4/PK,20PK/CS: Brand: MEDLINE

## (undated) DEVICE — COLON KIT: Brand: MEDLINE INDUSTRIES, INC.

## (undated) DEVICE — SUT VIC UD BR COAT 0 CP2 27IN

## (undated) DEVICE — APPL CHLORAPREP HI/LITE 26ML ORNG

## (undated) DEVICE — 6617 IOBAN II PATIENT ISOLATION DRAPE 5/BX,4BX/CS: Brand: STERI-DRAPE™ IOBAN™ 2

## (undated) DEVICE — GOWN,REINFORCE,POLY,SIRUS,BREATH SLV,XLG: Brand: MEDLINE

## (undated) DEVICE — DRSNG GZ PETROLTM XEROFORM CURAD 1X8IN STRL

## (undated) DEVICE — KT PT POSITION SUPINE HANA/PROFX TABL

## (undated) DEVICE — GLV SURG SENSICARE SLT PF LF 8.5 STRL

## (undated) DEVICE — 3M™ IOBAN™ 2 ANTIMICROBIAL INCISE DRAPE 6650EZ: Brand: IOBAN™ 2